# Patient Record
Sex: FEMALE | Race: WHITE | NOT HISPANIC OR LATINO | URBAN - METROPOLITAN AREA
[De-identification: names, ages, dates, MRNs, and addresses within clinical notes are randomized per-mention and may not be internally consistent; named-entity substitution may affect disease eponyms.]

---

## 2017-01-19 ENCOUNTER — OFFICE VISIT - RIVER FALLS (OUTPATIENT)
Dept: FAMILY MEDICINE | Facility: CLINIC | Age: 57
End: 2017-01-19

## 2017-01-19 ASSESSMENT — MIFFLIN-ST. JEOR: SCORE: 1203.86

## 2017-05-16 ENCOUNTER — OFFICE VISIT - RIVER FALLS (OUTPATIENT)
Dept: FAMILY MEDICINE | Facility: CLINIC | Age: 57
End: 2017-05-16

## 2017-05-16 ASSESSMENT — MIFFLIN-ST. JEOR: SCORE: 1202.95

## 2017-08-02 ENCOUNTER — OFFICE VISIT - RIVER FALLS (OUTPATIENT)
Dept: FAMILY MEDICINE | Facility: CLINIC | Age: 57
End: 2017-08-02

## 2017-08-02 ASSESSMENT — MIFFLIN-ST. JEOR: SCORE: 1209.3

## 2018-04-30 ENCOUNTER — OFFICE VISIT - RIVER FALLS (OUTPATIENT)
Dept: FAMILY MEDICINE | Facility: CLINIC | Age: 58
End: 2018-04-30

## 2018-04-30 ASSESSMENT — MIFFLIN-ST. JEOR: SCORE: 1249.22

## 2018-05-24 ENCOUNTER — OFFICE VISIT - RIVER FALLS (OUTPATIENT)
Dept: FAMILY MEDICINE | Facility: CLINIC | Age: 58
End: 2018-05-24

## 2018-07-18 ENCOUNTER — OFFICE VISIT - RIVER FALLS (OUTPATIENT)
Dept: FAMILY MEDICINE | Facility: CLINIC | Age: 58
End: 2018-07-18

## 2018-07-18 ASSESSMENT — MIFFLIN-ST. JEOR: SCORE: 1213.84

## 2018-08-27 ENCOUNTER — OFFICE VISIT - RIVER FALLS (OUTPATIENT)
Dept: FAMILY MEDICINE | Facility: CLINIC | Age: 58
End: 2018-08-27

## 2018-08-27 ASSESSMENT — MIFFLIN-ST. JEOR: SCORE: 1204.77

## 2019-10-04 ENCOUNTER — OFFICE VISIT - RIVER FALLS (OUTPATIENT)
Dept: FAMILY MEDICINE | Facility: CLINIC | Age: 59
End: 2019-10-04

## 2019-10-04 ENCOUNTER — COMMUNICATION - RIVER FALLS (OUTPATIENT)
Dept: FAMILY MEDICINE | Facility: CLINIC | Age: 59
End: 2019-10-04

## 2019-10-04 LAB
ANION GAP SERPL CALCULATED.3IONS-SCNC: 9 MMOL/L (ref 5–18)
BUN SERPL-MCNC: 15 MG/DL (ref 8–25)
BUN/CREAT RATIO - HISTORICAL: 17 (ref 10–20)
CALCIUM SERPL-MCNC: 9.5 MG/DL (ref 8.5–10.5)
CHLORIDE BLD-SCNC: 108 MMOL/L (ref 98–110)
CO2 SERPL-SCNC: 25 MMOL/L (ref 21–31)
CREAT SERPL-MCNC: 0.89 MG/DL (ref 0.57–1.11)
ERYTHROCYTE [DISTWIDTH] IN BLOOD BY AUTOMATED COUNT: 12.9 % (ref 11.5–15.5)
GFR ESTIMATE EXT - HISTORICAL: >60
GLUCOSE BLD-MCNC: 83 MG/DL (ref 65–100)
HCT VFR BLD AUTO: 42.5 % (ref 33–51)
HGB BLD-MCNC: 14.1 G/DL (ref 12–16)
MCH RBC QN AUTO: 32.1 PG (ref 26–34)
MCHC RBC AUTO-ENTMCNC: 33.2 G/DL (ref 32–36)
MCV RBC AUTO: 97 FL (ref 80–100)
PLATELET # BLD AUTO: 299 10*3/UL (ref 140–440)
PMV BLD: 9.9 FL (ref 6.5–11)
POTASSIUM BLD-SCNC: 3.9 MMOL/L (ref 3.5–5)
RBC # BLD AUTO: 4.39 10*6/UL (ref 4–5.2)
SODIUM SERPL-SCNC: 142 MMOL/L (ref 135–145)
WBC # BLD AUTO: 8.7 10*3/UL (ref 4.5–11)

## 2019-10-04 ASSESSMENT — MIFFLIN-ST. JEOR: SCORE: 1245.59

## 2019-10-07 ENCOUNTER — OFFICE VISIT - RIVER FALLS (OUTPATIENT)
Dept: FAMILY MEDICINE | Facility: CLINIC | Age: 59
End: 2019-10-07

## 2020-07-26 ENCOUNTER — NURSE TRIAGE (OUTPATIENT)
Dept: NURSING | Facility: CLINIC | Age: 60
End: 2020-07-26

## 2020-07-26 NOTE — TELEPHONE ENCOUNTER
On July 14th her  took the covid test. She currently has no symptoms. On Friday, she got a call and her  does have Covid. She is calling to find out if she should take a covid test, be on isolation for 14 days and how to get the test. Information given, and she will call the clinic in the morning and see about setting up a test. Denies need of instruction about home isolation precautions.    Ashanti Mcallister RN/ [facility]        Additional Information    Health Information question, no triage required and triager able to answer question    Protocols used: INFORMATION ONLY CALL-A-

## 2020-07-27 ENCOUNTER — AMBULATORY - RIVER FALLS (OUTPATIENT)
Dept: FAMILY MEDICINE | Facility: CLINIC | Age: 60
End: 2020-07-27

## 2020-07-27 ENCOUNTER — COMMUNICATION - RIVER FALLS (OUTPATIENT)
Dept: FAMILY MEDICINE | Facility: CLINIC | Age: 60
End: 2020-07-27

## 2020-07-27 ENCOUNTER — OFFICE VISIT - RIVER FALLS (OUTPATIENT)
Dept: FAMILY MEDICINE | Facility: CLINIC | Age: 60
End: 2020-07-27

## 2020-08-10 ENCOUNTER — COMMUNICATION - RIVER FALLS (OUTPATIENT)
Dept: FAMILY MEDICINE | Facility: CLINIC | Age: 60
End: 2020-08-10

## 2020-09-22 ENCOUNTER — OFFICE VISIT - RIVER FALLS (OUTPATIENT)
Dept: FAMILY MEDICINE | Facility: CLINIC | Age: 60
End: 2020-09-22

## 2020-09-22 ASSESSMENT — MIFFLIN-ST. JEOR: SCORE: 1274.62

## 2020-12-14 ENCOUNTER — COMMUNICATION - RIVER FALLS (OUTPATIENT)
Dept: FAMILY MEDICINE | Facility: CLINIC | Age: 60
End: 2020-12-14

## 2020-12-14 ENCOUNTER — OFFICE VISIT - RIVER FALLS (OUTPATIENT)
Dept: FAMILY MEDICINE | Facility: CLINIC | Age: 60
End: 2020-12-14

## 2020-12-14 ASSESSMENT — MIFFLIN-ST. JEOR: SCORE: 1291.86

## 2021-06-24 ENCOUNTER — OFFICE VISIT - RIVER FALLS (OUTPATIENT)
Dept: FAMILY MEDICINE | Facility: CLINIC | Age: 61
End: 2021-06-24

## 2021-06-24 ASSESSMENT — MIFFLIN-ST. JEOR: SCORE: 1255.12

## 2021-06-25 LAB
BUN SERPL-MCNC: 14 MG/DL (ref 7–25)
BUN/CREAT RATIO - HISTORICAL: NORMAL (ref 6–22)
CALCIUM SERPL-MCNC: 9.9 MG/DL (ref 8.6–10.4)
CHLORIDE BLD-SCNC: 108 MMOL/L (ref 98–110)
CHOLEST SERPL-MCNC: 267 MG/DL
CHOLEST/HDLC SERPL: 3.8 {RATIO}
CO2 SERPL-SCNC: 24 MMOL/L (ref 20–32)
CREAT SERPL-MCNC: 0.96 MG/DL (ref 0.5–0.99)
EGFRCR SERPLBLD CKD-EPI 2021: 64 ML/MIN/1.73M2
ERYTHROCYTE [DISTWIDTH] IN BLOOD BY AUTOMATED COUNT: 12.2 % (ref 11–15)
GLUCOSE BLD-MCNC: 87 MG/DL (ref 65–99)
HCT VFR BLD AUTO: 44.3 % (ref 35–45)
HDLC SERPL-MCNC: 70 MG/DL
HGB BLD-MCNC: 15.3 GM/DL (ref 11.7–15.5)
LDLC SERPL CALC-MCNC: 174 MG/DL
MCH RBC QN AUTO: 31.8 PG (ref 27–33)
MCHC RBC AUTO-ENTMCNC: 34.5 GM/DL (ref 32–36)
MCV RBC AUTO: 92.1 FL (ref 80–100)
NONHDLC SERPL-MCNC: 197 MG/DL
PLATELET # BLD AUTO: 312 10*3/UL (ref 140–400)
PMV BLD: 11.2 FL (ref 7.5–12.5)
POTASSIUM BLD-SCNC: 4.7 MMOL/L (ref 3.5–5.3)
RBC # BLD AUTO: 4.81 10*6/UL (ref 3.8–5.1)
SODIUM SERPL-SCNC: 140 MMOL/L (ref 135–146)
TRIGL SERPL-MCNC: 104 MG/DL
TSH SERPL DL<=0.005 MIU/L-ACNC: 1.09 MIU/L (ref 0.4–4.5)
WBC # BLD AUTO: 7.3 10*3/UL (ref 3.8–10.8)

## 2021-06-26 ENCOUNTER — COMMUNICATION - RIVER FALLS (OUTPATIENT)
Dept: FAMILY MEDICINE | Facility: CLINIC | Age: 61
End: 2021-06-26

## 2021-06-28 ENCOUNTER — COMMUNICATION - RIVER FALLS (OUTPATIENT)
Dept: FAMILY MEDICINE | Facility: CLINIC | Age: 61
End: 2021-06-28

## 2021-06-28 ENCOUNTER — TRANSFERRED RECORDS (OUTPATIENT)
Dept: HEALTH INFORMATION MANAGEMENT | Facility: CLINIC | Age: 61
End: 2021-06-28

## 2021-06-28 LAB — HPV ABSTRACT: NORMAL

## 2022-02-12 VITALS
BODY MASS INDEX: 22.7 KG/M2 | TEMPERATURE: 98.7 F | TEMPERATURE: 98.3 F | HEIGHT: 67 IN | DIASTOLIC BLOOD PRESSURE: 66 MMHG | HEART RATE: 66 BPM | HEIGHT: 67 IN | BODY MASS INDEX: 22.99 KG/M2 | SYSTOLIC BLOOD PRESSURE: 102 MMHG | SYSTOLIC BLOOD PRESSURE: 100 MMHG | DIASTOLIC BLOOD PRESSURE: 60 MMHG | WEIGHT: 144.6 LBS

## 2022-02-12 VITALS
OXYGEN SATURATION: 98 % | DIASTOLIC BLOOD PRESSURE: 64 MMHG | BODY MASS INDEX: 21.28 KG/M2 | SYSTOLIC BLOOD PRESSURE: 104 MMHG | DIASTOLIC BLOOD PRESSURE: 66 MMHG | WEIGHT: 137.6 LBS | OXYGEN SATURATION: 94 % | SYSTOLIC BLOOD PRESSURE: 120 MMHG | HEART RATE: 68 BPM | HEART RATE: 74 BPM | BODY MASS INDEX: 21.6 KG/M2 | WEIGHT: 135.6 LBS | HEIGHT: 67 IN | HEIGHT: 67 IN

## 2022-02-12 VITALS
HEART RATE: 100 BPM | TEMPERATURE: 98.5 F | SYSTOLIC BLOOD PRESSURE: 90 MMHG | DIASTOLIC BLOOD PRESSURE: 59 MMHG | BODY MASS INDEX: 21.22 KG/M2 | WEIGHT: 135.2 LBS | HEIGHT: 67 IN

## 2022-02-12 VITALS
DIASTOLIC BLOOD PRESSURE: 66 MMHG | BODY MASS INDEX: 24.3 KG/M2 | WEIGHT: 154.8 LBS | HEIGHT: 67 IN | SYSTOLIC BLOOD PRESSURE: 124 MMHG | HEART RATE: 72 BPM

## 2022-02-12 VITALS
BODY MASS INDEX: 23.7 KG/M2 | HEART RATE: 64 BPM | WEIGHT: 151 LBS | HEIGHT: 67 IN | SYSTOLIC BLOOD PRESSURE: 120 MMHG | RESPIRATION RATE: 16 BRPM | DIASTOLIC BLOOD PRESSURE: 68 MMHG | TEMPERATURE: 98.8 F

## 2022-02-12 VITALS
TEMPERATURE: 99.2 F | HEIGHT: 67 IN | HEART RATE: 68 BPM | WEIGHT: 135.4 LBS | DIASTOLIC BLOOD PRESSURE: 58 MMHG | BODY MASS INDEX: 21.25 KG/M2 | SYSTOLIC BLOOD PRESSURE: 110 MMHG

## 2022-02-12 VITALS
SYSTOLIC BLOOD PRESSURE: 98 MMHG | HEIGHT: 67 IN | HEART RATE: 64 BPM | WEIGHT: 146.7 LBS | DIASTOLIC BLOOD PRESSURE: 56 MMHG | BODY MASS INDEX: 23.02 KG/M2

## 2022-02-12 VITALS
BODY MASS INDEX: 21.44 KG/M2 | HEART RATE: 68 BPM | DIASTOLIC BLOOD PRESSURE: 64 MMHG | HEIGHT: 67 IN | TEMPERATURE: 97.9 F | SYSTOLIC BLOOD PRESSURE: 104 MMHG | WEIGHT: 136.6 LBS

## 2022-02-12 VITALS
SYSTOLIC BLOOD PRESSURE: 118 MMHG | HEART RATE: 72 BPM | BODY MASS INDEX: 22.82 KG/M2 | DIASTOLIC BLOOD PRESSURE: 66 MMHG | HEART RATE: 70 BPM | HEIGHT: 67 IN | DIASTOLIC BLOOD PRESSURE: 62 MMHG | BODY MASS INDEX: 23.12 KG/M2 | SYSTOLIC BLOOD PRESSURE: 114 MMHG | HEIGHT: 67 IN | WEIGHT: 145.4 LBS

## 2022-02-16 NOTE — TELEPHONE ENCOUNTER
Entered by Zeynep Milian CMA on January 14, 2020 5:55:12 PM CST  ---------------------  From: Zeynep Milian CMA   To: [place] [address]    Sent: 1/14/2020 5:55:11 PM CST  Subject: Medication Management     ** Submitted: **  Order:citalopram (citalopram 40 mg oral tablet)  1 tab(s)  Oral  daily  Qty:  30 tab(s)        Refills:  0          Substitutions Allowed     Route To Pharmacy - [place] [address]    Signed by Zeynep Milian CMA  1/14/2020 5:54:00 PM    ** Submitted: **  Complete:citalopram (citalopram 40 mg oral tablet)   Signed by Zeynep Milian CMA  1/14/2020 5:55:00 PM    ** Not Approved:  **  citalopram (Citalopram Hydrobromide 40 MG Oral Tablet)  TAKE 1 TABLET BY MOUTH  DAILY  Qty:  30 EA        Days Supply:  30        Refills:  0          Substitutions Allowed     Route To Pharmacy - [place] [address]   Signed by Zeynep Milian CMA            ------------------------------------------  From: [place] [address]  To: Serge Millan MD  Sent: January 14, 2020 9:50:04 AM CST  Subject: Medication Management  Due: January 15, 2020 9:50:04 AM CST    ** On Hold Pending Signature **  Drug: citalopram (citalopram 40 mg oral tablet)  TAKE 1 TABLET BY MOUTH  DAILY  Quantity: 30 EA  Days Supply: 30  Refills: 0  Substitutions Allowed  Notes from Pharmacy:     Dispensed Drug: citalopram (citalopram 40 mg oral tablet)  TAKE 1 TABLET BY MOUTH  DAILY  Quantity: 30 EA  Days Supply: 30  Refills: 0  Substitutions Allowed  Notes from Pharmacy:   ------------------------------------------Med Refill      Date of last office visit and reason:  10/7/19; concussion      Date of last Med Check / Px:   8/27/18; well adult  Date of last labs pertaining to med:  10/4/19    RTC order in chart:  yes; due now for px    For Protocol refill, has patient been contacted:  new RTC placed. msg sent to pharmacy

## 2022-02-16 NOTE — TELEPHONE ENCOUNTER
Order, demographics, and notes faxed to [facility], they will contact patient to schedule.

## 2022-02-16 NOTE — TELEPHONE ENCOUNTER
---------------------  From: López Sandhu MD   To: [facility] Message Pool ([id number]_[place] [facility] [place]);     Sent: 7/27/2020 8:59:12 AM CDT  Subject: General Message     Patient needs curbside testing.  She may qualify for [facility] dept testing.---------------------  From: Geeta Bah MA ([facility] Message Pool ([id number]_[facility]))   To: Appointment Pool ([id number]_[place]);     Sent: 7/27/2020 9:06:02 AM CDT  Subject: FW: General Message     Please call pt to set up curbside testing per [facility].  Thanks.[facility] re: occupation verification.SCHEDULED

## 2022-02-16 NOTE — NURSING NOTE
Comprehensive Intake Entered On:  6/24/2021 3:34 PM CDT    Performed On:  6/24/2021 3:29 PM CDT by Justine Redmond CMA               Summary   Chief Complaint :   Annual Physical-  c/o dizziness with movement when it gets really hot. Feels she's drinking a lot of water   Menstrual Status :   Postmenopausal   Weight Measured :   146.7 lb(Converted to: 146 lb 11 oz, 66.542 kg)    Height Measured :   66.5 in(Converted to: 5 ft 6 in, 168.91 cm)    Body Mass Index :   23.32 kg/m2   Body Surface Area :   1.77 m2   Systolic Blood Pressure :   98 mmHg   Diastolic Blood Pressure :   56 mmHg (LOW)    Mean Arterial Pressure :   70 mmHg   Peripheral Pulse Rate :   64 bpm   Justine Redmond CMA - 6/24/2021 3:29 PM CDT   Health Status   Allergies Verified? :   Yes   Medication History Verified? :   Yes   Immunizations Current :   No   Pre-Visit Planning Status :   Completed   Tobacco Use? :   Current every day smoker   Justine Redmond CMA - 6/24/2021 3:29 PM CDT   Consents   Consent for Immunization Exchange :   Consent Granted   Consent for Immunizations to Providers :   Consent Granted   Justine Redmond CMA - 6/24/2021 3:29 PM CDT   Meds / Allergies   (As Of: 6/24/2021 3:34:59 PM CDT)   Allergies (Active)   No known allergies  Estimated Onset Date:   Unspecified ; Created By:   Justine Roland; Reaction Status:   Active ; Category:   Drug ; Substance:   No known allergies ; Type:   Allergy ; Updated By:   Justine Roland; Source:   Paper Chart ; Reviewed Date:   12/15/2020 11:17 AM CST        Medication List   (As Of: 6/24/2021 3:34:59 PM CDT)   Prescription/Discharge Order    albuterol  :   albuterol ; Status:   Prescribed ; Ordered As Mnemonic:   albuterol 90 mcg/inh inhalation aerosol ; Simple Display Line:   See Instructions, INHALE TWO PUFFS BY MOUTH EVERY 4 HOURS AS NEEDED, 18 gm, 0 Refill(s) ; Ordering Provider:   Serge Millan MD; Catalog Code:   albuterol ; Order Dt/Tm:   1/26/2021  2:39:17 PM CST          citalopram  :   citalopram ; Status:   Prescribed ; Ordered As Mnemonic:   citalopram 40 mg oral tablet ; Simple Display Line:   1 tab(s), Oral, daily, *NEEDS APPT FOR FURTHER REFILLS*., 30 tab(s), 0 Refill(s) ; Ordering Provider:   Serge Millan MD; Catalog Code:   citalopram ; Order Dt/Tm:   5/21/2021 9:52:50 AM CDT          ibuprofen  :   ibuprofen ; Status:   Prescribed ; Ordered As Mnemonic:   ibuprofen 800 mg oral tablet ; Simple Display Line:   1 tab(s), Oral, q8 hrs, 50 tab(s), 0 Refill(s) ; Ordering Provider:   Serge Millan MD; Catalog Code:   ibuprofen ; Order Dt/Tm:   3/18/2021 10:02:57 AM CDT            ID Risk Screen   Recent Travel History :   No recent travel   Family Member Travel History :   No recent travel   Other Exposure to Infectious Disease :   Unknown   COVID-19 Testing Status :   No positive COVID-19 test   Justine Redmond CMA - 6/24/2021 3:29 PM CDT

## 2022-02-16 NOTE — NURSING NOTE
Comprehensive Intake Entered On:  10/7/2019 4:11 PM CDT    Performed On:  10/7/2019 4:08 PM CDT by Sheela Shaw               Summary   Chief Complaint :   Pt here today for f/up after passing out. She states that head still hurts.    Menstrual Status :   Postmenopausal   Height Measured :   66.5 in(Converted to: 5 ft 6 in, 168.91 cm)    Systolic Blood Pressure :   100 mmHg   Diastolic Blood Pressure :   66 mmHg   Mean Arterial Pressure :   77 mmHg   BP Site :   Right arm   BP Method :   Manual   HR Method :   Manual   Temperature Tympanic :   98.7 DegF(Converted to: 37.1 DegC)    Sheela Shaw - 10/7/2019 4:08 PM CDT   Health Status   Allergies Verified? :   Yes   Medication History Verified? :   Yes   Immunizations Current :   No   Medical History Verified? :   Yes   Pre-Visit Planning Status :   Completed   Sheela Shaw - 10/7/2019 4:08 PM CDT   Consents   Consent for Immunization Exchange :   Consent Granted   Consent for Immunizations to Providers :   Consent Granted   Sheela Shaw - 10/7/2019 4:08 PM CDT   Meds / Allergies   (As Of: 10/7/2019 4:11:28 PM CDT)   Allergies (Active)   No known allergies  Estimated Onset Date:   Unspecified ; Created By:   Justine Redmond CMA; Reaction Status:   Active ; Category:   Drug ; Substance:   No known allergies ; Type:   Allergy ; Updated By:   Justine Redmond CMA; Source:   Paper Chart ; Reviewed Date:   10/7/2019 4:09 PM CDT        Medication List   (As Of: 10/7/2019 4:11:28 PM CDT)   Prescription/Discharge Order    ibuprofen  :   ibuprofen ; Status:   Prescribed ; Ordered As Mnemonic:   ibuprofen 800 mg oral tablet ; Simple Display Line:   1 tab(s), Oral, q8 hrs, 50 tab(s), 0 Refill(s) ; Ordering Provider:   Serge Millan MD; Catalog Code:   ibuprofen ; Order Dt/Tm:   10/1/2019 10:54:16 AM CDT          citalopram  :   citalopram ; Status:   Prescribed ; Ordered As Mnemonic:   citalopram 40 mg oral  tablet ; Simple Display Line:   1 tab(s), Oral, daily, 30 tab(s), 0 Refill(s) ; Ordering Provider:   Serge Millan MD; Catalog Code:   citalopram ; Order Dt/Tm:   10/1/2019 10:52:26 AM CDT            Home Meds    acetaminophen  :   acetaminophen ; Status:   Documented ; Ordered As Mnemonic:   Tylenol Extra Strength ; Simple Display Line:   Oral, q6 hrs, 0 Refill(s) ; Catalog Code:   acetaminophen ; Order Dt/Tm:   10/7/2019 4:09:39 PM CDT

## 2022-02-16 NOTE — RESULTS
Spirometry POC Entered On:  5/23/2017 8:12 AM CDT    Performed On:  5/16/2017 4:47 PM CDT by Patricia Ohara               Spirometry POC   Forced Vital Capacity Predicted :   3.66 L   Forced Vital Capacity Actual :   2.43 L   Forced Vital Capacity % Predicted :   66 %   Forced Expiratory Volume 1sec Predicted :   2.85 L   Forced Expiratory Volume 1sec Actual :   1.91 L   Forced Expiratory Volume 1 % Predicted :   67 %   FEV1/FVC Predicted :   79 %   FEV1/FVC Actual :   79 %   FEV1/FVC % Predicted :   100 %   FEF 25 to 75   Predicted :   2.64 L   FEF 25 to 75 Actual :   1.66 L   FEF 25 to 75 % Predicted :   63 %   Spirometry POC Comments :   Moderate Restriction   Patricia Ohara - 5/23/2017 8:11 AM CDT

## 2022-02-16 NOTE — PROGRESS NOTES
Patient:   MYA MCDOWELL            MRN: [id number]            FIN: [id number]               Age:   60 years     Sex:  Female     :  1960   Associated Diagnoses:   Well adult exam; Mild depression; Emphysema/COPD   Author:   Serge Millan MD      Chief Complaint   2021 3:29 PM CDT    Annual Physical-  c/o dizziness with movement when it gets really hot. Feels she's drinking a lot of water        History of Present Illness   see chief complaint as noted above and confirmed with the patient     60 year old female presents for an annual exam. Overall she is doing pretty well. 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     Colonscopy: Last Colonoscopy in  and is to repeat in .     Pap smear: Last one done 2015 and results came back normal.     Immunizations: Has had both pneumonia vaccines, last Tdap in , did have covid vaccine      Review of Systems   Constitutional:  No fever, No fatigue.    Ear/Nose/Mouth/Throat:  No nasal congestion, No sore throat.    Respiratory:  has started to notice SOB with steps and harder activity, No cough, No sputum production.    Cardiovascular:  No chest pain, No palpitations, No peripheral edema, No syncope.    Gastrointestinal:  No nausea, No vomiting, No diarrhea.    Genitourinary:  No dysuria, No hematuria, No change in urine stream.    Hematology/Lymphatics:  No bruising tendency.    Musculoskeletal:  No back pain.    Integumentary:  No rash.    Neurologic:  Alert and oriented X4, No headache.    Psychiatric:  Depression, Depression: Well controlled with medications , No anxiety.       Health Status   Allergies:    Allergic Reactions (Selected)  No known allergies   Medications:  (Selected)   Prescriptions  Prescribed  Incruse Ellipta 62.5 mcg/inh inhalation powder: = 1 inh, Inhale, q 24 hrs, # 30 blister, 5 Refill(s), Type: Maintenance, Pharmacy: [facility], 1 inh  Inhale q 24 hrs, 66.5, in, 06/24/21 15:29:00 CDT, Height Measured, 146.7, lb, 06/24/21 15:29:00 CDT, Weight Measured  Nicoderm C-Q Clear 14 mg/24 hr transdermal film, extended release: 1 patch(es), TD, daily, x 28 day(s), # 28 patch(es), 0 Refill(s), Type: Acute, Pharmacy: [facility], 1 patch(es) TD daily,x28 day(s), 66.5, in, 06/24/21 15:29:00 CDT, Height Measured, 146.7, lb, 06/24/21 15:29:00 CDT, Weight Measured  albuterol 90 mcg/inh inhalation aerosol: See Instructions, Instructions: INHALE TWO PUFFS BY MOUTH EVERY 4 HOURS AS NEEDED, # 18 gm, 3 Refill(s), Pharmacy: [facility], INHALE TWO PUFFS BY MOUTH EVERY 4 HOURS AS NEEDED, 66.5, in, 06/24/21 15:29:00 CDT, Height Measured, 146.7, lb, 0...  buPROPion 150 mg/12 hours (SR) oral tablet, extended release: = 1 tab(s) ( 150 mg ), Oral, bid, # 60 tab(s), 0 Refill(s), Type: Maintenance, Pharmacy: [facility], 1 tab(s) Oral bid, 66.5, in, 06/24/21 15:29:00 CDT, Height Measured, 146.7, lb, 06/24/21 15:29:00 CDT, Weight Measured  citalopram 40 mg oral tablet: = 1 tab(s), Oral, daily, # 90 tab(s), 3 Refill(s), Type: Maintenance, Pharmacy: [facility], 1 tab(s) Oral daily,x90 day(s), 66.5, in, 06/24/21 15:29:00 CDT, Height Measured, 146.7, lb, 06/24/21 15:29:00 CDT, Weight Measured  ibuprofen 800 mg oral tablet: = 1 tab(s), Oral, q8 hrs, # 50 tab(s), 0 Refill(s), Type: Acute, Pharmacy: [facility], TAKE 1 TABLET BY MOUTH EVERY 8 HOURS, 66.5, in, 12/14/20 15:41:00 CST, Height Measured, 154.8, lb, 12/14/20 15:41:00 CST, Weight Measured,    Medications          *denotes recorded medication          albuterol 90 mcg/inh inhalation aerosol: See Instructions, INHALE TWO PUFFS BY MOUTH EVERY 4 HOURS AS NEEDED, 18 gm, 3 Refill(s).          buPROPion 150 mg/12 hours (SR) oral tablet, extended release: 150 mg, 1 tab(s), Oral, bid, 60 tab(s), 0 Refill(s).          citalopram 40 mg oral tablet: 1 tab(s), Oral, daily, for 90 day(s),  90 tab(s), 3 Refill(s).          ibuprofen 800 mg oral tablet: 1 tab(s), Oral, q8 hrs, 50 tab(s), 0 Refill(s).          Nicoderm C-Q Clear 14 mg/24 hr transdermal film, extended release: 1 patch(es), TD, daily, for 28 day(s), 28 patch(es), 0 Refill(s).          Incruse Ellipta 62.5 mcg/inh inhalation powder: 1 inh, Inhale, q 24 hrs, 30 blister, 5 Refill(s).       Problem list:    All Problems  Chronic diarrhea / 840690195 / Confirmed  Tobacco Abuse-Unspec / 305.1 / Confirmed  Mild depression / 518235267 / Confirmed  Emphysema/COPD / 451721604 / Confirmed  Resolved: Pregnancy / 086363997  Resolved: Pregnancy / 215001076  Resolved: Pregnancy / 575732690      Histories   Past Medical History:    Active  Chronic diarrhea (053180303)  Tobacco Abuse-Unspec (305.1)  Mild depression (794043672)  Resolved  Pregnancy (000975182):  Resolved on 5/18/1981 at 20 years.  Pregnancy (934115338):  Resolved on 10/28/1983 at 22 years.  Pregnancy (696599026):  Resolved on 12/23/1984 at 23 years.   Family History:    CA - Breast cancer  Aunt (P)  Comments:  5/29/2012 7:09 PM CDT - Brittny Hutchison  Late 40s or early 50s.  Alzheimer's disease  Father  CAD - Coronary artery disease  Father  High cholesterol  Father  Arthritis  Father     Procedure history:    Colonoscopy (SNOMED CT 642952616) performed by Prateek Marie MD on 7/7/2011 at 50 Years.  Comments:  12/16/2011 9:03 AM CST - Rosanna Rodriguez  Repeat in 5-10 yrs. pending pathology.   IV sedation.  Flexible sigmoidoscope (SNOMED CT 872121963) on 5/2/2007 at 46 Years.  Hysteroscopy (SNOMED CT 144845520) performed by Charlie Kim MD on 3/21/2002 at 41 Years.  D&C - Dilatation and curettage (SNOMED CT 6776417263) on 3/21/2002 at 41 Years.  Breast augmentation (SNOMED CT 5459592140) in 1996 at 36 Years.  Tubal ligation (SNOMED CT 344137851) in 1984 at 24 Years.  Pregnancy (SNOMED CT 291352658).  Comments:  4/15/2010 3:54 PM RAKESHT - Justine Roland  full term X 3   Social History:         Electronic Cigarette/Vaping Assessment            Electronic Cigarette Use: Never.      Alcohol Assessment            Current, 1-2 times per week, 3 drinks/episode average.  4 drinks/episode maximum.      Tobacco Assessment            10 or more cigarettes (1/2 pack or more)/day in last 30 days      Substance Abuse Assessment            Past, Marijuana      Employment and Education Assessment            Employed, Work/School description: /.      Home and Environment Assessment            Marital status: .  Spouse/Partner name: Mihai.  Risks in environment: Does not wear helmet, owns               secured gun.      Nutrition and Health Assessment            Type of diet: Regular.      Exercise and Physical Activity Assessment            Exercise frequency: Daily.  Exercise type: Walking.      Sexual Assessment            Sexually active: No.  Identifies as female, Sexual orientation: Straight or heterosexual.        Physical Examination   Vital Signs   6/24/2021 3:29 PM CDT Peripheral Pulse Rate 64 bpm    Systolic Blood Pressure 98 mmHg    Diastolic Blood Pressure 56 mmHg  LOW    Mean Arterial Pressure 70 mmHg      Measurements from flowsheet : Measurements   6/24/2021 3:29 PM CDT Height Measured 66.5 in    Weight Measured 146.7 lb    BSA 1.77 m2    Body Mass Index 23.32 kg/m2      General:  Alert and oriented, No acute distress.    Eye:  Pupils are equal, round and reactive to light, Normal conjunctiva.    HENT:  Normocephalic, Tympanic membranes are clear, Oral mucosa is moist, No pharyngeal erythema.    Neck:  Supple, Non-tender, No lymphadenopathy.    Respiratory:  Lungs are clear to auscultation, Respirations are non-labored.    Cardiovascular:  Normal rate, Regular rhythm, Normal peripheral perfusion, No edema.    Breast:  No mass, No tenderness, No discharge.         Shape/size: Bilaterally, Within normal limits.    Gastrointestinal:  Soft, Non-tender, Non-distended, No  organomegaly.    Genitourinary:  normal external genitalia  normal vaginal mucosa  os cx is mobile and nontender, no lesions  no adnexal tendernes or masses.    Musculoskeletal:  Normal range of motion, Normal strength, No swelling, Normal gait.    Integumentary:  Warm, No rash.    Neurologic:  Alert, Oriented.    Psychiatric:  Cooperative, Appropriate mood & affect, Normal judgment.       Review / Management   Results review      Impression and Plan       Diagnosis     Well adult exam (WYY53-UB Z00.00).     Mild depression (HTE58-BX F32.0).     Emphysema/COPD (BPS34-WR J43.9).     Course:  reviewed exercise and diet   discussed weight and nutrition  reviewed health maintence and encouraged completion  questions were answered regarding health, medicines and future expectations.    Plan:  will do colonoscopy  started on incruse and will use albuterol prn  stop smoking and reviewed treatment help choices  .    Orders     Orders (Selected)   Prescriptions  Prescribed  Incruse Ellipta 62.5 mcg/inh inhalation powder: = 1 inh, Inhale, q 24 hrs, # 30 blister, 5 Refill(s), Type: Maintenance, Pharmacy: [facility], 1 inh Inhale q 24 hrs, 66.5, in, 06/24/21 15:29:00 CDT, Height Measured, 146.7, lb, 06/24/21 15:29:00 CDT, Weight Measured  Nicoderm C-Q Clear 14 mg/24 hr transdermal film, extended release: 1 patch(es), TD, daily, x 28 day(s), # 28 patch(es), 0 Refill(s), Type: Acute, Pharmacy: [facility], 1 patch(es) TD daily,x28 day(s), 66.5, in, 06/24/21 15:29:00 CDT, Height Measured, 146.7, lb, 06/24/21 15:29:00 CDT, Weight Measured  albuterol 90 mcg/inh inhalation aerosol: See Instructions, Instructions: INHALE TWO PUFFS BY MOUTH EVERY 4 HOURS AS NEEDED, # 18 gm, 3 Refill(s), Pharmacy: [facility], INHALE TWO PUFFS BY MOUTH EVERY 4 HOURS AS NEEDED, 66.5, in, 06/24/21 15:29:00 CDT, Height Measured, 146.7, lb, 0...  buPROPion 150 mg/12 hours (SR) oral tablet, extended release: = 1 tab(s) ( 150 mg  ), Oral, bid, # 60 tab(s), 0 Refill(s), Type: Maintenance, Pharmacy: [facility], 1 tab(s) Oral bid, 66.5, in, 06/24/21 15:29:00 CDT, Height Measured, 146.7, lb, 06/24/21 15:29:00 CDT, Weight Measured  citalopram 40 mg oral tablet: = 1 tab(s), Oral, daily, # 90 tab(s), 3 Refill(s), Type: Maintenance, Pharmacy: [facility], 1 tab(s) Oral daily,x90 day(s), 66.5, in, 06/24/21 15:29:00 CDT, Height Measured, 146.7, lb, 06/24/21 15:29:00 CDT, Weight Measured.     Melinda TELLES Medical Assistant acted solely as a scribe for, and in presence of Dr. Serge Millan who performed the services.

## 2022-02-16 NOTE — TELEPHONE ENCOUNTER
---------------------  From: Zeynep Milian CMA ([place] Pool ([id number]))   To: [facility] Message Pool ([id number]_[place] - [place]);     Sent: 3/18/2021 7:01:30 AM CDT  Subject: FW: Medication Management   Due Date/Time: 3/17/2021 1:33:00 PM CDT     last filled 9/15/20 #50, 0 refills  12/14/21 shingles   [facility] due now for px        ** Submitted: **  Order:citalopram (citalopram 40 mg oral tablet)  1 tab(s)  Oral  daily  Qty:  30 tab(s)        Refills:  0          Substitutions Allowed     Route To Pharmacy - [facility]    Signed by Zeynep Milian CMA  3/18/2021 12:00:00 PM [facility]    ** Submitted: **  Complete:citalopram (citalopram 40 mg oral tablet)   Signed by Zeynep Milian CMA  3/18/2021 12:00:00 PM [facility]    ** Not Approved:  **  citalopram (Citalopram Hydrobromide 40 MG Oral Tablet)  Take 1 tablet by mouth once daily  Qty:  90 tab(s)        Days Supply:  90        Refills:  0          Substitutions Allowed     Route To Pharmacy - [place] [address]   Signed by Zeynep Milian CMA            ------------------------------------------  From: [facility]  To: Serge Millan MD  Sent: March 16, 2021 1:33:52 PM CDT  Subject: Medication Management  Due: March 16, 2021 4:14:43 PM CDT     ** On Hold Pending Signature **     Dispensed Drug: citalopram (citalopram 40 mg oral tablet), Take 1 tablet by mouth once daily  Quantity: 90 tab(s)  Days Supply: 90  Refills: 0  Substitutions Allowed  Notes from Pharmacy:     ** On Hold Pending Signature **     Dispensed Drug: ibuprofen (ibuprofen 800 mg oral tablet), TAKE 1 TABLET BY MOUTH EVERY 8 HOURS  Quantity: 50 tab(s)  Days Supply: 17  Refills: 0  Substitutions Allowed  Notes from Pharmacy:  ---------------------------------------------------------------  From: Justine Redmond CMA ([facility] Message Pool ([id number]_[facility]))   To: Serge Millan MD;     Sent: 3/18/2021 8:14:25 AM CDT  Subject: FW: Medication Management   Due Date/Time: 3/17/2021 1:33:00 PM  CDT     please advise on ibuprofen---------------------  From: Serge Millan MD   To: [facility]    Sent: 3/18/2021 10:03:00 AM CDT  Subject: FW: Medication Management     ** Submitted: **  Complete:ibuprofen (ibuprofen 800 mg oral tablet)   Signed by Serge Millan MD  3/18/2021 3:03:00 PM [facility]    ** Approved with modifications: **  ibuprofen (Ibuprofen 800 MG Oral Tablet)  TAKE 1 TABLET BY MOUTH EVERY 8 HOURS  Qty:  50 tab(s)        Days Supply:  17        Refills:  0          Substitutions Allowed     Route To Pharmacy - [place] [id number]

## 2022-02-16 NOTE — NURSING NOTE
Comprehensive Intake Entered On:  12/14/2020 3:45 PM CST    Performed On:  12/14/2020 3:41 PM CST by Justine Redmond CMA               Summary   Chief Complaint :   c/o right shoulder pain with rash, numbness and tingling down arm. Fell in Sept in same area. Taking ibuprofen 800mg q 4 hrs but only lasts for about 3 hrs   Menstrual Status :   Postmenopausal   Weight Measured :   154.8 lb(Converted to: 154 lb 13 oz, 70.216 kg)    Height Measured :   66.5 in(Converted to: 5 ft 6 in, 168.91 cm)    Body Mass Index :   24.61 kg/m2   Body Surface Area :   1.81 m2   Systolic Blood Pressure :   124 mmHg   Diastolic Blood Pressure :   66 mmHg   Mean Arterial Pressure :   85 mmHg   Peripheral Pulse Rate :   72 bpm   Justine Redmond CMA - 12/14/2020 3:41 PM CST   Health Status   Allergies Verified? :   Yes   Medication History Verified? :   Yes   Immunizations Current :   No   Pre-Visit Planning Status :   Completed   Tobacco Use? :   Current every day smoker   Justine Redmond CMA - 12/14/2020 3:41 PM CST   Consents   Consent for Immunization Exchange :   Consent Granted   Consent for Immunizations to Providers :   Consent Granted   Justine Redmond CMA - 12/14/2020 3:41 PM CST   Meds / Allergies   (As Of: 12/14/2020 3:45:53 PM CST)   Allergies (Active)   No known allergies  Estimated Onset Date:   Unspecified ; Created By:   Justine Roland; Reaction Status:   Active ; Category:   Drug ; Substance:   No known allergies ; Type:   Allergy ; Updated By:   Justine Roland; Source:   Paper Chart ; Reviewed Date:   9/22/2020 1:24 PM CDT        Medication List   (As Of: 12/14/2020 3:45:53 PM CST)   Prescription/Discharge Order    citalopram  :   citalopram ; Status:   Prescribed ; Ordered As Mnemonic:   citalopram 40 mg oral tablet ; Simple Display Line:   1 tab(s), Oral, daily, 90 tab(s), 0 Refill(s) ; Ordering Provider:   Serge Millan MD; Catalog Code:   citalopram ; Order Dt/Tm:   9/15/2020  12:19:20 PM CDT          ibuprofen  :   ibuprofen ; Status:   Prescribed ; Ordered As Mnemonic:   ibuprofen 800 mg oral tablet ; Simple Display Line:   1 tab(s), Oral, q8 hrs, 50 tab(s), 0 Refill(s) ; Ordering Provider:   Serge Millna MD; Catalog Code:   ibuprofen ; Order Dt/Tm:   9/15/2020 12:19:21 PM CDT          varenicline  :   varenicline ; Status:   Processing ; Ordered As Mnemonic:   Chantix 1 mg oral tablet ; Ordering Provider:   Serge Millan MD; Action Display:   Complete ; Catalog Code:   varenicline ; Order Dt/Tm:   12/14/2020 3:44:16 PM CST            ID Risk Screen   Recent Travel History :   No recent travel   Family Member Travel History :   No recent travel   Other Exposure to Infectious Disease :   Unknown   Justine Redomnd CMA - 12/14/2020 3:41 PM CST   Social History   Social History   (As Of: 12/14/2020 3:45:53 PM CST)   Alcohol:        Current, 1-2 times per week, 3 drinks/episode average.  4 drinks/episode maximum.   (Last Updated: 8/29/2018 8:49:40 AM CDT by Patricia Ohara)          Tobacco:        10 or more cigarettes (1/2 pack or more)/day in last 30 days   (Last Updated: 12/14/2020 3:44:36 PM CST by Justine Redmond CMA)          Electronic Cigarette/Vaping:        Electronic Cigarette Use: Never.   (Last Updated: 12/14/2020 3:44:39 PM CST by Justine Redmond CMA)          Substance Abuse:        Past, Marijuana   (Last Updated: 5/16/2017 10:32:03 AM CDT by Patricia Ohara)          Employment/School:        Employed, Work/School description: /.   (Last Updated: 5/16/2017 10:31:33 AM CDT by Patricia Ohara)          Home/Environment:        Marital status: .  Spouse/Partner name: Mihai.  Risks in environment: Does not wear helmet, owns secured gun.   (Last Updated: 5/16/2017 10:32:28 AM CDT by Patricia Ohara)          Nutrition/Health:        Type of diet: Regular.   (Last Updated: 3/15/2016 12:59:49 PM CDT by Melinda Drake MA           Exercise:        Exercise frequency: Daily.  Exercise type: Walking.   (Last Updated: 5/16/2017 10:32:54 AM CDT by Patricia Ohara)          Sexual:        Sexually active: No.  Identifies as female, Sexual orientation: Straight or heterosexual.   (Last Updated: 8/29/2018 8:50:40 AM CDT by Patricia Ohara)

## 2022-02-16 NOTE — TELEPHONE ENCOUNTER
---------------------  From: Aleah Boo CMA   Sent: 8/9/2020 8:34:14 AM CDT  Subject: COVID result     Called and notified her that COVID19 was negative, she verbalized a good understanding.       MIKAL Boo CMA
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
## 2022-02-16 NOTE — LETTER
(Inserted Image. Unable to display)         January 16, 2020        MYA MCDOWELL  [address] [place], [place] [id number]        Dear MYA,    Thank you for selecting [facility] for your healthcare needs.    Our records indicate you are due for the following services:     Annual Physical     To schedule an appointment or if you have further questions, please contact your primary clinic:   [facility]       [phone]   [facility]       [phone]              [facility]     [phone]      Powered by [employer]    Sincerely,    Serge Millan MD

## 2022-02-16 NOTE — TELEPHONE ENCOUNTER
Order faxed to [facility] patient is aware they will contact her to schedule.      History of Lung cancer?  No  Signs/symptoms   No  Current smoke  Yes   Number ppd .75  x  22.5  years = total  Attestation to shared decision making and smoking cessation guidance completed? Yes

## 2022-02-16 NOTE — TELEPHONE ENCOUNTER
---------------------  From: Smitha Oneill CMA   To: MYA MCDOWELL    Sent: 5/21/2021 9:52:41 AM CDT  Subject: Refill     Dear Mya,    We received a refill request for your Citalopram. You are due for an appointment to follow up for this medication. You are also due for your annual physical. Please schedule an appointment, I have sent in a 30 day supply of your medication.     Thank you,    Smitha MCCLENDON CMA   none

## 2022-02-16 NOTE — PROGRESS NOTES
Chief Complaint    Patient is here c/o back pain/hip pain on left side. Pain has been present 7/27/17 when patient lifted a cooler. Has been using ice and heat which have helped. Has shooting pains in legs when lifting items.  History of Present Illness      HPI patient presents to clinic today with left-sided back pain.  It initially started on 7/27 with acute pain that occurred while lifting a cooler and twisting.  Ibuprofen ice heat and warm shower to help with the pain and was starting to feel better however at work she was asked to do some lifting and this caused acute worsening of symptoms again at this time the pain is aching and occasionally shoots down her left leg it is worth with movement loss of control of bowel or bladder she has no saddle paresthesias she reports that it hurts when she pushes area by her left upper buttock and also extending down laterally and wraps around the front of her hip.  It does not radiate into the groin and she describes the pain as cramping and spasm and of moderate intensity  Review of Systems      No fevers chills nausea vomiting diarrhea constipation sore throat runny nose change of all the bladder rash or changes in appetite.  Physical Exam   Vitals & Measurements    T: 97.9(Tympanic)  HR: 68(Peripheral)  BP: 104/64     HT: 66.5 in  WT: 136.6 lb  BMI: 21.72       General alert and oriented ×3 time a injected anicteric hearing grossly normal at conversational levels neck is supple and dry without rashes cardiovascular brisk capillary refill and normal perfusion chest equal bilateral chest rise with no increased work of breathing however prolonged expiratory phase.  Axial skeletal patient has a slow sit to stand using her arms to assist with this she hasn't I tell Russell tomas she has tenderness in her left lower paraspinous muscles some tenderness to palpation in her left upper [place] however I am no tenderness over the sacroiliac joint notification classes are palpated she  have some tenderness over her truck and her personal however no swelling is noted she also has tenderness to palpation at her left groin but again no discrete masses no redness or warmth tenderness is mild  Assessment/Plan       Hip strain         Offered physical therapy patient declined at this time she is welcome to let me know if she changes her mind or to follow-up with her PCP.  Her ibuprofen was just refilled to plan to continue that we will plan to add a muscle relaxant she was cautioned not to try driving while on this medication she will return to clinic if her symptoms worsen or do not improve         Ordered:          methocarbamol, 1 tab(s) ( 500 mg ), PO, QID, PRN: for muscle pain, # 30 tab(s), 1 Refill(s), Type: Maintenance, Pharm[id number], 1 tab(s) po qid,x7 day(s),PRN:for muscle pain           Problem List/Past Medical History    Ongoing     Chronic diarrhea     Emphysema/COPD     Mild depression     Tobacco Abuse-Unspec    Historical     Pregnancy     Pregnancy     Pregnancy  Procedure/Surgical History     Colonoscopy (07/07/2011)     Mammography; bilateral (01/28/2009)     Flexible sigmoidoscope (05/02/2007)     D&C - Dilatation and curettage (03/21/2002)     Hysteroscopy (03/21/2002)     Breast augmentation (1996)     Tubal ligation (1984)     Pregnancy  Medications    citalopram 40 mg oral tablet, 40 mg= 1 tab(s), po, daily, 3 refills    ibuprofen 800 mg oral tablet, 800 mg= 1 tab(s), po, q8 hrs, 2 refills    Robaxin 500 mg oral tablet, 500 mg= 1 tab(s), po, qid, PRN, 1 refills  Allergies    No known allergies  Social History    Smoking Status - 08/02/2017     Current every day smoker     Alcohol - 05/16/2017      Current, 2 times per week, 6 drinks/episode average.     Employment and Education - 05/16/2017      Employed, Work/School description: /.     Exercise and Physical Activity - 05/16/2017      Exercise frequency: Daily. Exercise type:  Walking.     Home and Environment - 05/16/2017      Marital status: . Spouse/Partner name: Mihai. Risks in environment: Does not wear helmet, owns secured gun.     Nutrition and Health - 05/16/2017      Type of diet: Regular.     Sexual - 05/16/2017      Sexually active: No. Sexual orientation: Heterosexual.     Substance Abuse - 05/16/2017      Past, Marijuana     Tobacco - 05/16/2017      Current, Cigarettes, 20 per day. 34 year(s).  Family History    Alzheimer's disease: Father.    CA - Breast cancer: Aunt (P).    CAD - Coronary artery disease: Father.  Immunizations      Vaccine Date Status      pneumococcal (PCV13) 05/16/2017 Given      pneumococcal (PPSV23) 03/15/2016 Given      tetanus/diphth/pertuss (Tdap) adult/adol 12/20/2012 Given      Td 07/26/2005 Recorded  Lab Results      Results (Last 90 days)      No results located.
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
## 2022-02-16 NOTE — TELEPHONE ENCOUNTER
---------------------  From: Nohemy Kemp   Sent: 7/27/2020 4:09:26 PM CDT  Subject: Curbside Testing     Patient was in for curbside testing.   Per Dr. Sandhu            O2 sat= 98%  Specimen sent to [facility] lab.     Forms faxed to [facility].     Priority# 2

## 2022-02-16 NOTE — TELEPHONE ENCOUNTER
---------------------  From: Serge Millan MD   To: MYA MCDOWELL    Sent: 6/26/2021 9:22:51 PM CDT  Subject: General Message     Your good cholesterol is excellent but your bad cholesterol is too high.   The most important step for you is to stop smoking.      Results:  Date Result Name Ind Value Ref Range   6/24/2021 4:28 PM Sodium Level  140 mmol/L (135 - 146)   6/24/2021 4:28 PM Potassium Level  4.7 mmol/L (3.5 - 5.3)   6/24/2021 4:28 PM Chloride Level  108 mmol/L (98 - 110)   6/24/2021 4:28 PM CO2 Level  24 mmol/L (20 - 32)   6/24/2021 4:28 PM Glucose Level  87 mg/dL (65 - 99)   6/24/2021 4:28 PM BUN  14 mg/dL (7 - 25)   6/24/2021 4:28 PM Creatinine Level  0.96 mg/dL (0.50 - 0.99)   6/24/2021 4:28 PM BUN/Creat Ratio  NOT APPLICABLE (6 - 22)   6/24/2021 4:28 PM eGFR  64 mL/min/1.73m2 (> OR = 60 - )   6/24/2021 4:28 PM eGFR African American  75 mL/min/1.73m2 (> OR = 60 - )   6/24/2021 4:28 PM Calcium Level  9.9 mg/dL (8.6 - 10.4)   6/24/2021 4:28 PM Cholesterol ((H)) 267 mg/dL ( - <200)   6/24/2021 4:28 PM Non-HDL Cholesterol ((H)) 197 ( - <130)   6/24/2021 4:28 PM HDL  70 mg/dL (> OR = 50 - )   6/24/2021 4:28 PM Cholesterol/HDL Ratio  3.8 ( - <5.0)   6/24/2021 4:28 PM LDL ((H)) 174    6/24/2021 4:28 PM Triglyceride  104 mg/dL ( - <150)   6/24/2021 4:28 PM TSH  1.09 mIU/L (0.40 - 4.50)   6/24/2021 4:28 PM WBC  7.3 (3.8 - 10.8)   6/24/2021 4:28 PM RBC  4.81 (3.80 - 5.10)   6/24/2021 4:28 PM Hgb  15.3 gm/dL (11.7 - 15.5)   6/24/2021 4:28 PM Hct  44.3 % (35.0 - 45.0)   6/24/2021 4:28 PM MCV  92.1 fL (80.0 - 100.0)   6/24/2021 4:28 PM MCH  31.8 pg (27.0 - 33.0)   6/24/2021 4:28 PM MCHC  34.5 gm/dL (32.0 - 36.0)   6/24/2021 4:28 PM RDW  12.2 % (11.0 - 15.0)   6/24/2021 4:28 PM Platelet  312 (140 - 400)   6/24/2021 4:28 PM MPV  11.2 fL (7.5 - 12.5)

## 2022-02-16 NOTE — PROGRESS NOTES
Patient:   BERONICA MCDOWELL            MRN: [id number]            FIN: [id number]               Age:   57 years     Sex:  Female     :  1960   Associated Diagnoses:   Encounter related to worker's compensation claim; Ankle sprain; Smoking   Author:   Serge Millan MD      Chief Complaint   2018 3:19 PM CDT    pt here for work comp fu, is still wearing the ankle brace on right ankle, feels it protects it      History of Present Illness   see chief complaint as noted above and confirmed with the patient     57 year old female present's today for work comp follow up injury. Beronica injured her right ankle at work on 2018, she was diagnosed with tendonitis of the right ankle, she was given an airsplint. She is doing well, she continues to wear the airsplint as she feel's it protects her ankle from being further injured, she say's there was a day when a child at work accidently ran into her and did hit her ankle, she was grateful she had the airsplint on to protect her ankle. She denies any pain in the ankle, she has been doing excercises like writing the alphabet with her ankle.       Review of Systems   Constitutional:  No fever, No chills.    Eye   Ear/Nose/Mouth/Throat:  No nasal congestion, No sore throat.    Respiratory:  No shortness of breath, No cough.    Cardiovascular   Breast   Gastrointestinal:  No nausea, No vomiting, No diarrhea, No constipation.    Genitourinary:  No dysuria.    Gynecologic   Hematology/Lymphatics:  No bruising tendency, No swollen lymph glands.    Endocrine   Immunologic:  No recurrent fevers, No recurrent infections.    Musculoskeletal:  No muscle pain.    Integumentary:  No rash.    Neurologic:  No tingling, No headache.    Psychiatric   All other systems.     Health Status   Allergies.Medications.Problem list.   Histories   Past Medical History.Family History.Procedure history.Social History.   Physical Examination   Vital Signs   2018 3:19 PM CDT Peripheral  Pulse Rate 74 bpm    Pulse Site Radial artery    Systolic Blood Pressure 104 mmHg    Diastolic Blood Pressure 64 mmHg    Mean Arterial Pressure 77 mmHg    BP Site Right arm    Oxygen Saturation 94 %      Measurements from flowsheet : Measurements   7/18/2018 3:19 PM CDT Height Measured - Standard 66.5 in    Weight Measured - Standard 137.6 lb    BSA 1.71 m2    Body Mass Index 21.87 kg/m2      General:  Alert and oriented, No acute distress.    Eye:  Pupils are equal, round and reactive to light, Normal conjunctiva.    HENT:  Oral mucosa is moist.    Neck:  Supple.    Respiratory:  Respirations are non-labored.    Cardiovascular:  Normal rate, Regular rhythm, No edema.    Gastrointestinal:  Non-distended.    Musculoskeletal:  Normal gait.    Integumentary:  Warm, No rash.    Psychiatric:  Cooperative, Appropriate mood & affect, Normal judgment.       Review / Management   Results review      Impression and Plan       Diagnosis     Encounter related to worker's compensation claim (BGU25-ML Z02.6).     Ankle sprain (MHQ29-CA S93.409A).     Smoking (GAN22-QB F17.200).     Plan:  No further work restirctions, no longer in need of brace at work.     Enocouraged to continue excercises with the ankle.   counseled to quit smoking and discussed options.    Melinda TELLES Medical Assistant acted solely as a scribe for, and in presence of Dr. Serge Millan who performed the services.

## 2022-02-16 NOTE — NURSING NOTE
Comprehensive Intake Entered On:  10/4/2019 10:12 AM CDT    Performed On:  10/4/2019 10:03 AM CDT by Aleah Boo CMA               Summary   Chief Complaint :   c/o falling yesterday in her garage and hitting back of  head on cement floor, has laceration on back of head, did lose consciousness,  headache   Menstrual Status :   Postmenopausal   Weight Measured :   144.6 lb(Converted to: 144 lb 10 oz, 65.59 kg)    Height Measured :   66.5 in(Converted to: 5 ft 6 in, 168.91 cm)    Body Mass Index :   22.99 kg/m2   Body Surface Area :   1.75 m2   Systolic Blood Pressure :   102 mmHg   Diastolic Blood Pressure :   60 mmHg   Mean Arterial Pressure :   74 mmHg   Peripheral Pulse Rate :   66 bpm   BP Site :   Right arm   Pulse Site :   Radial artery   BP Method :   Manual   HR Method :   Manual   Temperature Tympanic :   98.3 DegF(Converted to: 36.8 DegC)    Aleah Boo CMA - 10/4/2019 10:03 AM CDT   Health Status   Allergies Verified? :   Yes   Medication History Verified? :   Yes   Immunizations Current :   No   Medical History Verified? :   No   Pre-Visit Planning Status :   Completed   Tobacco Use? :   Current every day smoker   Aleah Boo CMA - 10/4/2019 10:03 AM CDT   Consents   Consent for Immunization Exchange :   Consent Granted   Consent for Immunizations to Providers :   Consent Granted   Aleah Boo CMA - 10/4/2019 10:03 AM CDT   Meds / Allergies   (As Of: 10/4/2019 10:13:00 AM CDT)   Allergies (Active)   No known allergies  Estimated Onset Date:   Unspecified ; Created By:   Justine Roland; Reaction Status:   Active ; Category:   Drug ; Substance:   No known allergies ; Type:   Allergy ; Updated By:   Justine Roland; Source:   Paper Chart ; Reviewed Date:   10/4/2019 10:10 AM CDT        Medication List   (As Of: 10/4/2019 10:13:00 AM CDT)   Prescription/Discharge Order    citalopram  :   citalopram ; Status:   Prescribed ; Ordered As Mnemonic:   citalopram 40 mg oral tablet ; Simple  Display Line:   1 tab(s), Oral, daily, 30 tab(s), 0 Refill(s) ; Ordering Provider:   Serge Millan MD; Catalog Code:   citalopram ; Order Dt/Tm:   10/1/2019 10:52:26 AM CDT          ibuprofen  :   ibuprofen ; Status:   Prescribed ; Ordered As Mnemonic:   ibuprofen 800 mg oral tablet ; Simple Display Line:   1 tab(s), Oral, q8 hrs, 50 tab(s), 0 Refill(s) ; Ordering Provider:   Serge Millan MD; Catalog Code:   ibuprofen ; Order Dt/Tm:   10/1/2019 10:54:16 AM CDT

## 2022-02-16 NOTE — TELEPHONE ENCOUNTER
---------------------  From: Justine Redmond CMA (eRx Pool ([facility]))   To: JOSH Mishra Pool ([address]_[place] [facility] [place]);     Sent: 9/15/2020 11:30:14 AM CDT  Subject: FW: Medication Management   Due Date/Time: 9/16/2020 9:15:00 AM CDT           ------------------------------------------  From: [id number]  To: Serge Millan MD  Sent: September 15, 2020 9:15:05 AM CDT  Subject: Medication Management  Due: September 9, 2020 4:20:39 PM CDT     ** On Hold Pending Signature **     Dispensed Drug: citalopram (citalopram 40 mg oral tablet), Take 1 tablet by mouth once daily  Quantity: 90 tab(s)  Days Supply: 90  Refills: 0  Substitutions Allowed  Notes from Pharmacy:     ** On Hold Pending Signature **     Dispensed Drug: ibuprofen (ibuprofen 800 mg oral tablet), TAKE 1 TABLET BY MOUTH EVERY 8 HOURS  Quantity: 50 tab(s)  Days Supply: 17  Refills: 0  Substitutions Allowed  Notes from Pharmacy:  ---------------------------------------------------------------  From: Justine Redmond CMA (JOSH Message Pool ([address]))   To: Serge Millan MD;     Sent: 9/15/2020 11:38:32 AM CDT  Subject: FW: Medication Management   Due Date/Time: 9/16/2020 9:15:00 AM CDT     LV: 7/27/20 Covid exposure  Last px: 8/27/18  Was due visit in 8/2019. Has not made follow up appt  Last fill:3/16/20 90 tabs with 0 refills---------------------  From: Serge Millan MD   To: [facility]    Sent: 9/15/2020 12:19:26 PM CDT  Subject: FW: Medication Management     ** Submitted: **  Complete:ibuprofen (ibuprofen 800 mg oral tablet)   Signed by Serge Millan MD  9/15/2020 5:19:00 PM [facility]    ** Submitted: **  Complete:citalopram (citalopram 40 mg oral tablet)   Signed by Serge Millan MD  9/15/2020 5:19:00 PM [facility]    ** Approved with modifications: **  citalopram (Citalopram Hydrobromide 40 MG Oral Tablet)  Take 1 tablet by mouth once daily  Qty:  90 tab(s)        Days Supply:  90         Refills:  0          Substitutions Allowed     Route To [facility]       ** Approved with modifications: **  ibuprofen (Ibuprofen 800 MG Oral Tablet)  TAKE 1 TABLET BY MOUTH EVERY 8 HOURS  Qty:  50 tab(s)        Days Supply:  17        Refills:  0          Substitutions Allowed     Route To [facility]

## 2022-02-16 NOTE — TELEPHONE ENCOUNTER
---------------------  From: Justine Redmond CMA   To: Referral Coordinators Pool ([address]_[facility]);     Sent: 6/24/2021 4:34:13 PM CDT  Subject: General Message     FYI: Dr. Millan placed a order for a CT scan todayNoted.

## 2022-02-16 NOTE — TELEPHONE ENCOUNTER
Entered by Bisi Bashir CMA on October 01, 2019 10:54:51 AM CDT  ---------------------  From: Bisi Bashir CMA   To: [place] [id number]    Sent: 10/1/2019 10:54:51 AM CDT  Subject: Medication Management     ** Submitted: **  Order:ibuprofen (ibuprofen 800 mg oral tablet)  1 tab(s)  Oral  q8 hrs  Qty:  50 tab(s)        Days Supply:  17        Refills:  0          Substitutions Allowed     Route To [place] [address]    Signed by Bisi Bashir CMA  10/1/2019 10:54:00 AM    ** Not Approved:  **  ibuprofen (IBUPROFEN 800MG     TAB)  TAKE 1 TABLET BY MOUTH EVERY 8 HOURS  Qty:  50 tab(s)        Days Supply:  17        Refills:  2          Substitutions Allowed     Route To [place] [address]   Note from Pharmacy:  Please consider 90 day supplies to promote better adherence  Signed by Bisi Bashir CMA            ** Submitted: **  Order:citalopram (citalopram 40 mg oral tablet)  1 tab(s)  Oral  daily  Qty:  30 tab(s)        Refills:  0          Substitutions Allowed     Route To [place] [address]    Signed by Bisi Bashir CMA  10/1/2019 10:52:00 AM    ** Submitted: **  Complete:citalopram (citalopram 40 mg oral tablet)   Signed by Bisi Bashir CMA  10/1/2019 10:52:00 AM    ** Not Approved:  **  citalopram (CITALOPRAM 40MG     TAB)  TAKE 1 TABLET BY MOUTH ONCE DAILY  Qty:  90 tab(s)        Days Supply:  90        Refills:  3          Substitutions Allowed     Route To [place] [address]   Signed by Bisi Bashir CMA            ------------------------------------------  From: [place] [address]  To: Serge Millan MD  Sent: September 28, 2019 11:16:45 AM CDT  Subject: Medication Management  Due: September 29, 2019 11:16:45 AM CDT    ** On Hold Pending Signature **  Drug: ibuprofen (ibuprofen 800 mg oral tablet)  TAKE 1 TABLET BY MOUTH EVERY 8 HOURS  Quantity: 50 tab(s)     Days Supply: 0         Refills: 0  Substitutions Allowed  Notes from  Pharmacy:     Dispensed Drug: ibuprofen (ibuprofen 800 mg oral tablet)  TAKE 1 TABLET BY MOUTH EVERY 8 HOURS  Quantity: 50 tab(s)     Days Supply: 17        Refills: 2  Substitutions Allowed  Notes from Pharmacy: Please consider 90 day supplies to promote better adherence    ** On Hold Pending Signature **  Drug: citalopram (citalopram 40 mg oral tablet)  TAKE 1 TABLET BY MOUTH ONCE DAILY  Quantity: 90 tab(s)     Days Supply: 0         Refills: 0  Substitutions Allowed  Notes from Pharmacy:     Dispensed Drug: citalopram (citalopram 40 mg oral tablet)  TAKE 1 TABLET BY MOUTH ONCE DAILY  Quantity: 90 tab(s)     Days Supply: 90        Refills: 3  Substitutions Allowed  Notes from Pharmacy:   ------------------------------------------Refilled per protocol. RTC in place. Note also sent to pharmacy.

## 2022-02-16 NOTE — PROGRESS NOTES
Chief Complaint    Pt here today for f/up after passing out. She states that head still hurts.  History of Present Illness      58-year-old woman here with concussion and syncopal episode.  Several days ago she had been in a hot tub for over 2 hours.  She got out to the hot shower to wash off the hot tub and then was sitting outside smoking a cigarette.  She fell and hit her head although she does not have memory of she has a scratch in the back of her head and was seen.  She had a negative CT of the head and some normal labs.  Since then she has taken it easy until today and she felt okay today she went to work where she works as a .  Had a pretty busy physical day.  She had a headache got worse by noon she had to take some Tylenol and it stayed hurting through the rest of the afternoon.  She has not had any visual difficulties.  No chest pain or pressure.  No palpitations.  No lightheadedness or dizziness.  No balance issues.  No trouble with thinking or decision-making.  No extremity pain or tingling.       She has had an echo and Holter monitor ordered  Review of Systems      See HPI.  All other review of systems negative.  Physical Exam   Vitals & Measurements    T: 98.7   F (Tympanic)  BP: 100/66     HT: 66.5 in       Alert and oriented       Normal cognition        Good range of motion of the neck        Normal gait and balance        Breathing is nonlabored  Assessment/Plan       1. Concussion (S06.0X9A)         I explained how the headache is a concussion.  She needs to rest and I recommend at least the next 2 days off of work.  And while off work she should be resting her brain and allowing it to heal she seems to understand this concept and is agreed to take it easy and let me know if symptoms persist or any new symptoms present himself       2. Syncope (R55)         She has history that is certainly strongly suggest to orthostatic changes probably from the hot tub of hot shower and inadequate  oral intake.  I have explained to her that she is a smoker long-term and certainly risk for heart problems and will be reasonable to undergo further evaluation.  If you have any further episodes evaluation would be of increasing importance and she should stop smoking  Patient Information     Name:MYA MCDOWELL      Address:      [address] [place] [id number]     Sex:Female     YOB: 1960     [phone]     Emergency Contact:SERGO MCDOWELL     MRN:[id number]     FIN:[id number]     Location:[facility]     Date of Service:10/07/2019      Primary Care Physician:       Serge Millan MD, [phone]      Attending Physician:       Serge Millan MD, [phone]  Problem List/Past Medical History    Ongoing     Chronic diarrhea     Emphysema/COPD     Mild depression     Tobacco Abuse-Unspec    Historical     Pregnancy     Pregnancy     Pregnancy  Procedure/Surgical History     Colonoscopy (07/07/2011)      Comments: Repeat in 5-10 yrs. pending pathology.      IV sedation..     Mammography; bilateral (01/28/2009)     Flexible sigmoidoscope (05/02/2007)     D&C - Dilatation and curettage (03/21/2002)     Hysteroscopy (03/21/2002)     Breast augmentation (1996)     Tubal ligation (1984)     Pregnancy      Comments: full term X 3.  Medications    citalopram 40 mg oral tablet, 1 tab(s), Oral, daily    ibuprofen 800 mg oral tablet, 1 tab(s), Oral, q8 hrs    Tylenol Extra Strength, Oral, q6 hrs  Allergies    No known allergies  Social History    Smoking Status - 10/04/2019     Current every day smoker     Alcohol      Current, 1-2 times per week, 3 drinks/episode average. 4 drinks/episode maximum., 08/29/2018     Employment/School      Employed, Work/School description: /., 05/16/2017     Exercise      Exercise frequency: Daily. Exercise type: Walking., 05/16/2017     Home/Environment      Marital status: . Spouse/Partner name: MihaiJani  Risks in environment: Does not wear helmet, owns secured gun., 05/16/2017     Nutrition/Health      Type of diet: Regular., 03/15/2016     Sexual      Sexually active: No. Identifies as female, Sexual orientation: Straight or heterosexual., 08/29/2018     Substance Abuse      Past, Marijuana, 05/16/2017     Tobacco      Current, Cigarettes, 20 per day. 34 year(s)., 01/30/2015  Family History    Alzheimer's disease: Father.    Arthritis: Father.    CA - Breast cancer: Aunt (P).    CAD - Coronary artery disease: Father.    High cholesterol: Father.    Sister: History is negative    Brother: History is negative    Brother: History is negative    Daughter: History is negative    Daughter: History is negative    Daughter: History is negative  Immunizations      Vaccine Date Status      pneumococcal (PCV13) 05/16/2017 Given      pneumococcal (PPSV23) 03/15/2016 Given      tetanus/diphth/pertuss (Tdap) adult/adol 12/20/2012 Given      Td 07/26/2005 Recorded  Lab Results          Lab Results (Last 4 results within 90 days)           Sodium Level: 142 [135 mmol/L - 145 mmol/L] (10/04/19 11:23:00)          Potassium Level: 3.9 [3.5 mmol/L - 5 mmol/L] (10/04/19 11:23:00)          Chloride Level: 108 [98 mmol/L - 110 mmol/L] (10/04/19 11:23:00)          CO2 Level: 25 [21 mmol/L - 31 mmol/L] (10/04/19 11:23:00)          AGAP: 9 [5  - 18] (10/04/19 11:23:00)          Glucose Level: 83 [65 mg/dL - 100 mg/dL] (10/04/19 11:23:00)          BUN: 15 [8 mg/dL - 25 mg/dL] (10/04/19 11:23:00)          Creatinine Level: 0.89 [0.57 mg/dL - 1.11 mg/dL] (10/04/19 11:23:00)          BUN/Creat Ratio: 17 [10  - 20] (10/04/19 11:23:00)          eGFR : >60 (10/04/19 11:23:00)          eGFR Non-: >60 (10/04/19 11:23:00)          Calcium Level: 9.5 [8.5 mg/dL - 10.5 mg/dL] (10/04/19 11:23:00)          WBC: 8.7 [4.5  - 11] (10/04/19 11:23:00)          RBC: 4.39 [4  - 5.2] (10/04/19 11:23:00)          Hgb: 14.1 [12 g/dL  - 16 g/dL] (10/04/19 11:23:00)          Hct: 42.5 [33 % - 51 %] (10/04/19 11:23:00)          MCV: 97 [80 fL - 100 fL] (10/04/19 11:23:00)          MCH: 32.1 [26 pg - 34 pg] (10/04/19 11:23:00)          MCHC: 33.2 [32 g/dL - 36 g/dL] (10/04/19 11:23:00)          RDW: 12.9 [11.5 % - 15.5 %] (10/04/19 11:23:00)          Platelet: 299 [140  - 440] (10/04/19 11:23:00)          MPV: 9.9 [6.5 fL - 11 fL] (10/04/19 11:23:00)

## 2022-02-16 NOTE — TELEPHONE ENCOUNTER
Entered by Meena Petersen RN on October 07, 2019 1:42:53 PM CDT  OK      ---------------------  From: Jo Box (Appointment Pool ([address]))   To: [facility] ( [facility]);     Sent: 10/7/2019 12:51:24 PM CDT  Subject: RE: 24 Hour Holter           Entered by Jo Box on October 07, 2019 12:51:13 PM CDT  patient declined.  Has an appt with Dr. Millan this evening and wants to get his recommendation.      ---------------------  From: Ana Pittman RN ([facility] ( [address]))   To: [facility] ([facility]);     Sent: 10/4/2019 4:58:29 PM CDT  Subject: 24 Hour Holter     Please call to schedule 24 hour Holter per GJ. Needs next day appointment for removal.  Reply to this message when scheduled.    Thank you,    Ana KING

## 2022-02-16 NOTE — TELEPHONE ENCOUNTER
---------------------  From: Tri Andrews   To: Phone Messages Pool ([address]_[place] [facility] [place]);     Sent: 7/27/2020 10:50:20 AM CDT  Subject: General Message     PCP:   JOSH      Time of Call:  0932       Person Calling:  Pt  Phone number:  [phone]    Returned call at: 1040    Note:   LM but call dropped in the beginning of her message. I called her back and LM informing her of this and that she can call us back and LM again.     Last office visit and reason:  Saw JOSH today for COVID exposure via telemed.Pt called back and states everything has been taken care of. no need to call back.

## 2022-02-16 NOTE — TELEPHONE ENCOUNTER
---------------------  From: Sheela Shaw (Phone Messages Pool ([address]_[facility]))   Sent: 12/14/2020 5:15:26 PM CST  Subject: Rx     Phone Message    PCP:   JOSH                           Time of Call:  349m                                        Person Calling:  Pt  Phone number:      Returned call at: 514pm    Note:   Pt called stating that Rx is not at the pharmacy. I checked and it looks like it was sent, informed pt that I did resend it and to call us back if she runs into any other problems. She had no further questions at this time.     Last office visit and reason:  12/14/2020; Note not complete yet.

## 2022-02-16 NOTE — NURSING NOTE
"Comprehensive Intake Entered On:  9/22/2020 1:27 PM CDT    Performed On:  9/22/2020 1:19 PM CDT by Sukhjinder Holliday CMA               Summary   Chief Complaint :   Pt here for states she fell and hit her arm on a towel rack DOI 9-19.  Pt states she \"blacked out\" and doesnt remember falling.   Menstrual Status :   Postmenopausal   Weight Measured :   151 lb(Converted to: 151 lb 0 oz, 68.49 kg)    Height Measured :   66.5 in(Converted to: 5 ft 6 in, 168.91 cm)    Body Mass Index :   24 kg/m2   Body Surface Area :   1.79 m2   Systolic Blood Pressure :   120 mmHg   Diastolic Blood Pressure :   68 mmHg   Mean Arterial Pressure :   85 mmHg   Peripheral Pulse Rate :   64 bpm   BP Site :   Right arm   Pulse Site :   Radial artery   Temperature Tympanic :   98.8 DegF(Converted to: 37.1 DegC)    Respiratory Rate :   16 br/min   Sukhjinder Holliday CMA - 9/22/2020 1:19 PM CDT   Health Status   Allergies Verified? :   Yes   Medication History Verified? :   Yes   Immunizations Current :   No   Medical History Verified? :   Yes   Pre-Visit Planning Status :   Not completed   Tobacco Use? :   Current every day smoker   Tobacco Cessation Review :   Not ready to quit   Sukhjinder Holliday CMA - 9/22/2020 1:19 PM CDT   Meds / Allergies   (As Of: 9/22/2020 1:27:34 PM CDT)   Allergies (Active)   No known allergies  Estimated Onset Date:   Unspecified ; Created By:   Justine Roland; Reaction Status:   Active ; Category:   Drug ; Substance:   No known allergies ; Type:   Allergy ; Updated By:   Justine Roland; Source:   Paper Chart ; Reviewed Date:   9/22/2020 1:24 PM CDT        Medication List   (As Of: 9/22/2020 1:27:34 PM CDT)   Prescription/Discharge Order    varenicline  :   varenicline ; Status:   Prescribed ; Ordered As Mnemonic:   Chantix 1 mg oral tablet ; Simple Display Line:   1 mg, 1 tab(s), Oral, bid, 60 tab(s), 5 Refill(s) ; Ordering Provider:   Serge Millan MD; Catalog Code:   varenicline ; Order Dt/Tm:   " 8/10/2020 2:15:19 PM CDT          ibuprofen  :   ibuprofen ; Status:   Prescribed ; Ordered As Mnemonic:   ibuprofen 800 mg oral tablet ; Simple Display Line:   1 tab(s), Oral, q8 hrs, 50 tab(s), 0 Refill(s) ; Ordering Provider:   Serge Millan MD; Catalog Code:   ibuprofen ; Order Dt/Tm:   9/15/2020 12:19:21 PM CDT          citalopram  :   citalopram ; Status:   Prescribed ; Ordered As Mnemonic:   citalopram 40 mg oral tablet ; Simple Display Line:   1 tab(s), Oral, daily, 90 tab(s), 0 Refill(s) ; Ordering Provider:   Serge Millan MD; Catalog Code:   citalopram ; Order Dt/Tm:   9/15/2020 12:19:20 PM CDT            ID Risk Screen   Recent Travel History :   No recent travel   Family Member Travel History :   No recent travel   Other Exposure to Infectious Disease :   Unknown   Sukhjinder Holliday CMA - 9/22/2020 1:19 PM CDT   Social History   Social History   (As Of: 9/22/2020 1:27:34 PM CDT)   Alcohol:        Current, 1-2 times per week, 3 drinks/episode average.  4 drinks/episode maximum.   (Last Updated: 8/29/2018 8:49:40 AM CDT by Patricia Ohara)          Tobacco:        Current, Cigarettes, 20 per day.  34 year(s).   (Last Updated: 1/30/2015 3:58:41 PM CST by Sukhjinder Holliday CMA)          Substance Abuse:        Past, Marijuana   (Last Updated: 5/16/2017 10:32:03 AM CDT by Patricia Ohara)          Employment/School:        Employed, Work/School description: /.   (Last Updated: 5/16/2017 10:31:33 AM CDT by Patricia Ohara)          Home/Environment:        Marital status: .  Spouse/Partner name: Mihai.  Risks in environment: Does not wear helmet, owns secured gun.   (Last Updated: 5/16/2017 10:32:28 AM CDT by Patricia Ohara)          Nutrition/Health:        Type of diet: Regular.   (Last Updated: 3/15/2016 12:59:49 PM CDT by [place] MA, Melinda M)          Exercise:        Exercise frequency: Daily.  Exercise type: Walking.   (Last Updated: 5/16/2017 10:32:54 AM CDT by Lev  Patricia)          Sexual:        Sexually active: No.  Identifies as female, Sexual orientation: Straight or heterosexual.   (Last Updated: 8/29/2018 8:50:40 AM CDT by Patricia Ohara)

## 2022-02-16 NOTE — TELEPHONE ENCOUNTER
---------------------  From: Roxie Barrientos CMA (Phone Messages Pool ([id number]))   To: [facility] Pool ([id number]);     Sent: 8/10/2020 1:06:38 PM CDT  Subject: Phone Message, chantix.     Phone Message    PCP:   JOSH      Time of Call:  1224       Person Calling:  sandra Topete.  Phone number:  [phone].    Returned call at: 1300    Note:   Calls for Chantix prescription. To quit smoking sent to [facility] in [place]. Please advise.    Last office visit and reason:  07/27/2020 COVID-19 exposure GTG.---------------------  From: Tri Andrews ([facility] Message Pool ([id number]))   To: Serge Millan MD;     Sent: 8/10/2020 2:12:52 PM CDT  Subject: FW: Phone Message, chantix.     has never been prescribed before.  ** Submitted: **  Order:varenicline (Chantix 1 mg oral tablet)  1 tab(s)  Oral  bid  Qty:  60 tab(s)        Refills:  5          Substitutions Allowed     Route To Pharmacy - [facility]    Signed by Serge Millan MD  8/10/2020 7:15:00 PM [facility]---------------------  From: Serge Millan MD   To: [facility] Message Pool ([id number]);     Sent: 8/10/2020 2:16:12 PM CDT  Subject: RE: Phone Message, chantix.     sent in rx.  may want to take 1 per day for the first weekNotified pt by VM on identified VMAlva in the message below is a different pt's mom, this pt's mom did not call, the pt called.

## 2022-02-16 NOTE — PROGRESS NOTES
Chief Complaint    verbal consent given for telemed visit.   dx w/ COVID, employer wants pt to get tested, has been asymptomatic, works for school district, has been working.   Patient is being evaluated via a billable telephone visit.   This telephone visit will be conducted via a call between you and your physician.   The patient has been notified of the following:   Today's visit was conducted via telephone due to the COVID-19 pandemic. Patient's consent to telephone visit was obtained and documented.  We have found that certain health care needs can be provided without need for a physical exam.  This service lets us provide the care your need with a short telephone conversation.  If a prescription is necessary, we can send it directly to your pharmacy.  If lab work is needed, we can place an order in your chart and you can schedule an appointment for the test/tests at a later time   Call Start Time: 0845   Call End Time: 0855  History of Present Illness       tested positive for COVID-19 about three weeks ago and employer wants her tested.  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  She works in [place].   Review of Systems          ROS reviewed and negative except for symptoms noted in HPI.  Assessment/Plan       1. Exposure to COVID-19 virus (Z20.828)        Testing ordered        Case being followed by public health        Self quarantine until tests results return  Patient Information     Name:MYA MCDOWELL      Address:      [address] [place] [id number]     Sex:Female     YOB: 1960     [phone]     Emergency Contact:SERGO MCDOWELL     MRN:[id number]     FIN:[id number]     Location:[facility]     Date of Service:07/27/2020      Primary Care Physician:       Serge Millan MD, [phone]      Attending Physician:       López Sandhu MD, [phone]  Problem List/Past Medical History    Ongoing     Chronic diarrhea      Emphysema/COPD     Mild depression     Tobacco Abuse-Unspec    Historical     Pregnancy     Pregnancy     Pregnancy  Procedure/Surgical History     Colonoscopy (07/07/2011)      Comments: Repeat in 5-10 yrs. pending pathology.      IV sedation..     Mammography; bilateral (01/28/2009)     Flexible sigmoidoscope (05/02/2007)     D&C - Dilatation and curettage (03/21/2002)     Hysteroscopy (03/21/2002)     Breast augmentation (1996)     Tubal ligation (1984)     Pregnancy      Comments: full term X 3.  Medications    citalopram 40 mg oral tablet, 1 tab(s), Oral, daily    ibuprofen 800 mg oral tablet, 1 tab(s), Oral, q8 hrs  Allergies    No known allergies  Social History    Smoking Status - 07/27/2020     Current every day smoker     Alcohol      Current, 1-2 times per week, 3 drinks/episode average. 4 drinks/episode maximum., 08/29/2018     Employment/School      Employed, Work/School description: /., 05/16/2017     Exercise      Exercise frequency: Daily. Exercise type: Walking., 05/16/2017     Home/Environment      Marital status: . Spouse/Partner name: Mihai. Risks in environment: Does not wear helmet, owns secured gun., 05/16/2017     Nutrition/Health      Type of diet: Regular., 03/15/2016     Sexual      Sexually active: No. Identifies as female, Sexual orientation: Straight or heterosexual., 08/29/2018     Substance Abuse      Past, Marijuana, 05/16/2017     Tobacco      Current, Cigarettes, 20 per day. 34 year(s)., 01/30/2015  Family History    Alzheimer's disease: Father.    Arthritis: Father.    CA - Breast cancer: Aunt (P).    CAD - Coronary artery disease: Father.    High cholesterol: Father.    Sister: History is negative    Brother: History is negative    Brother: History is negative    Daughter: History is negative    Daughter: History is negative    Daughter: History is negative  Immunizations      Vaccine Date Status          pneumococcal (PCV13) 05/16/2017 Given           pneumococcal (PPSV23) 03/15/2016 Given          tetanus/diphth/pertuss (Tdap) adult/adol 12/20/2012 Given          Td 07/26/2005 Recorded

## 2022-02-16 NOTE — PROGRESS NOTES
Patient:   MYA MCDOWELL            MRN: [id number]            FIN: [id number]               Age:   57 years     Sex:  Female     :  1960   Associated Diagnoses:   None   Author:   Justine Redmond CMA       -   Today's date:    2018.        -   To whom it may concern:        This patient Was seen in my office on  2018.  .  He/ she may return to work, with the following restrictions: minimal climbing ladders, and rest ankle 2-3 times a day for 10min each..  Please contact me if you have any questions or concerns.      -   Sincerely,

## 2022-02-16 NOTE — PROGRESS NOTES
Patient:   MYA MCDOWELL            MRN: [id number]            FIN: [id number]               Age:   57 years     Sex:  Female     :  1960   Associated Diagnoses:   Right ankle pain   Author:   Serge Millan MD      Visit Information      Date of Service: 2018 01:15 pm  [facility]  Encounter#: [id number]      Primary Care Provider (PCP):  Serge Millan MD    NPI# [id number]      Referring Provider:  Serge Millan MD    NPI# [id number]      Chief Complaint   2018 1:30 PM CDT    c/o right ankle pain since .      History of Present Illness   Chief c omplaint confirmed with patient. States that she was walking at work when her right ankle began to have sharp shooting pain down the lateral aspect. 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 Pain persisted throughout the week despite ice and BID use of 800 mg ibuprofen. Pain is exacerbated by walking. Going down stairs backwards seems to be less strenuous on the area. 4 days ago the pain seemed to worsen after a child landed on the ankle at work. Pain now is additionally in the top of her right foot and radiates up her mid calf. Denies any surgiers or injuries to the right ankle. No numbness, tingling, or breaks in skin near ankle.       Review of Systems   Constitutional:  No fever, No chills.    Musculoskeletal:  Negative except as documented in history of present illness.    Integumentary:  No rash, No breakdown, No skin lesion.    Neurologic:  No numbness, No tingling.       Health Status   Allergies:    Allergic Reactions (Selected)  No known allergies      Histories   Social History:        Alcohol Assessment            Current, 2 times per week, 6 drinks/episode average.      Tobacco Assessment            Current, Cigarettes, 20 per day.  34 year(s).      Substance Abuse Assessment            Past, Marijuana      Employment and Education  Assessment            Employed, Work/School description: /.      Home and Environment Assessment            Marital status: .  Spouse/Partner name: Mihai.  Risks in environment: Does not wear helmet, owns               secured gun.      Nutrition and Health Assessment            Type of diet: Regular.      Exercise and Physical Activity Assessment            Exercise frequency: Daily.  Exercise type: Walking.      Sexual Assessment            Sexually active: No.  Sexual orientation: Heterosexual.        Physical Examination   Vital Signs   4/30/2018 1:30 PM CDT Peripheral Pulse Rate 72 bpm    Systolic Blood Pressure 114 mmHg    Diastolic Blood Pressure 62 mmHg    Mean Arterial Pressure 79 mmHg      Measurements from flowsheet : Measurements   4/30/2018 1:30 PM CDT Height Measured - Standard 66.5 in    Weight Measured - Standard 145.4 lb    BSA 1.76 m2    Body Mass Index 23.11 kg/m2      General:  Alert and oriented, No acute distress.    Respiratory:  Lungs are clear to auscultation, Respirations are non-labored, Breath sounds are equal.    Cardiovascular:  Normal rate, Regular rhythm.    Musculoskeletal:  significant tenderness to palpation of lateral aspect of ankle and dorsum of foot. No calf tenderness. No heel tenderness. Capillary refill <2 seconds on all toes. Pain with eversion of ankle. Flexion, extension, and inversion unremarkable. Pedal and tibial pulses intact. 5/5 strengths in foot. .    Integumentary:  Warm, Dry, Pink, Intact.    Psychiatric:  Appropriate mood & affect.       Review / Management   Radiology results   X-ray   Findings: Negative right ankle. No fracture or dislocation.       Impression and Plan   Diagnosis     Right ankle pain (KVY79-IV M25.571).     Plan:  Discussed x ray results with patient. This is likely an ankle tendonitis due to overuse during plowing. Encouraged her to stretch the ankle every day for the next 2 weeks by drawing the alphabet  with her right foot. Also encouraged ibuprofen as needed for pain and to take with meals. Provided an air brace to wear in her shoe during work. .    Patient Instructions:       Counseled: Patient, Regarding diagnosis, Regarding treatment, Activity, Verbalized understanding.

## 2022-02-16 NOTE — PROGRESS NOTES
Patient:   MYA MCDOWELL            MRN: [id number]            FIN: [id number]               Age:   59 years     Sex:  Female     :  1960   Associated Diagnoses:   Shingles   Author:   Serge Millan MD      Visit Information      Date of Service: 2020 03:38 pm  Performing Location: [facility]  Encounter#: [id number]      Primary Care Provider (PCP):  Serge Millan MD    NPI# [id number]      Referring Provider:  Serge Millan MD    NPI# [id number]      Chief Complaint   2020 3:41 PM CST   c/o right shoulder pain with rash, numbness and tingling down arm. Fell in Sept in same area. Taking ibuprofen 800mg q 4 hrs but only lasts for about 3 hrs        Subjective   Chief complaint 2020 3:41 PM CST   c/o right shoulder pain with rash, numbness and tingling down arm. Fell in Sept in same area. Taking ibuprofen 800mg q 4 hrs but only lasts for about 3 hrs  .  Additional information pain started yesterday  rash started last night.        Health Status   Allergies:    Allergic Reactions (Selected)  No known allergies   Medications:  (Selected)   Prescriptions  Prescribed  citalopram 40 mg oral tablet: = 1 tab(s), Oral, daily, # 90 tab(s), 0 Refill(s), Type: Maintenance, Pharmacy: [facility], Take 1 tablet by mouth once daily, 66.5, in, 10/07/19 16:08:00 CDT, Height Measured, 144.6, lb, 10/04/19 10:03:00 CDT, Weight Measured  ibuprofen 800 mg oral tablet: = 1 tab(s), Oral, q8 hrs, # 50 tab(s), 0 Refill(s), Type: Acute, Pharmacy: [facility], TAKE 1 TABLET BY MOUTH EVERY 8 HOURS, 66.5, in, 10/07/19 16:08:00 CDT, Height Measured, 144.6, lb, 10/04/19 10:03:00 CDT, Weight Measured  valACYclovir 1 g oral tablet: = 1 tab(s) ( 1 gm ), Oral, q8 hrs, x 7 day(s), # 21 tab(s), 0 Refill(s), Type: Acute, Pharmacy: [facility], 1 tab(s) Oral q8 hrs,x7 day(s), 66.5, in, 20 15:41:00 CST, Height Measured, 154.8, lb, 20 15:41:00 CST, Weight  Measured,    Medications          *denotes recorded medication          citalopram 40 mg oral tablet: 1 tab(s), Oral, daily, 90 tab(s), 0 Refill(s).          ibuprofen 800 mg oral tablet: 1 tab(s), Oral, q8 hrs, 50 tab(s), 0 Refill(s).          valACYclovir 1 g oral tablet: 1 gm, 1 tab(s), Oral, q8 hrs, for 7 day(s), 21 tab(s), 0 Refill(s).       Problem list:    All Problems (Selected)  Chronic diarrhea / 939406813 / Confirmed  Tobacco Abuse-Unspec / 305.1 / Confirmed  Mild depression / 615797462 / Confirmed  Emphysema/COPD / 811057926 / Confirmed      Objective   Vital Signs   12/14/2020 3:41 PM CST Peripheral Pulse Rate 72 bpm    Systolic Blood Pressure 124 mmHg    Diastolic Blood Pressure 66 mmHg    Mean Arterial Pressure 85 mmHg      Measurements from flowsheet : Measurements   12/14/2020 3:41 PM CST Height Measured - Standard 66.5 in    Weight Measured - Standard 154.8 lb    BSA 1.81 m2    Body Mass Index 24.61 kg/m2      cluster of small blisters on lateral chest on cusp of axilla      Impression and Plan   Assessment and Plan:          Diagnosis: Shingles (JYA49-SJ B02.9).    Orders      (Selected)   Prescriptions  Prescribed  valACYclovir 1 g oral tablet: = 1 tab(s) ( 1 gm ), Oral, q8 hrs, x 7 day(s), # 21 tab(s), 0 Refill(s), Type: Acute, Pharmacy: [facility], 1 tab(s) Oral q8 hrs,x7 day(s), 66.5, in, 12/14/20 15:41:00 CST, Height Measured, 154.8, lb, 12/14/20 15:41:00 CST, Weight Measured.     reviewed topical anesthetics  Reviewed expected course, what to watch for and when to return..     .

## 2022-02-16 NOTE — TELEPHONE ENCOUNTER
Entered by Smitha Oneill CMA on May 21, 2021 9:53:04 AM CDT  ---------------------  From: Smitha Oneill CMA   To: [place] [id number]    Sent: 5/21/2021 9:53:04 AM CDT  Subject: Medication Management     ** Submitted: **  Order:citalopram (citalopram 40 mg oral tablet)  1 tab(s)  Oral  daily  *NEEDS APPT FOR FURTHER REFILLS*.  Qty:  30 tab(s)        Days Supply:  30        Refills:  0          Substitutions Allowed     Route To [place] [address]    Signed by Smitha Oneill CMA  5/21/2021 2:52:00 PM [facility]    ** Submitted: **  Complete:citalopram (citalopram 40 mg oral tablet)   Signed by Smitha Oneill CMA  5/21/2021 2:53:00 PM [facility]    ** Not Approved:  **  citalopram (Citalopram Hydrobromide 40 MG Oral Tablet)  TAKE 1 TABLET BY MOUTH ONCE DAILY*NEEDS APPT FOR FURTHER REFILLS*  Qty:  30 tab(s)        Days Supply:  30        Refills:  0          Substitutions Allowed     Route To [place] [address]   Signed by Smitha Oneill CMA            ------------------------------------------  From: [place] [address]  To: Serge Millan MD  Sent: May 19, 2021 2:42:35 PM CDT  Subject: Medication Management  Due: May 14, 2021 8:11:22 PM CDT     ** On Hold Pending Signature **     Dispensed Drug: citalopram (citalopram 40 mg oral tablet), TAKE 1 TABLET BY MOUTH ONCE DAILY*NEEDS APPT FOR FURTHER REFILLS*  Quantity: 30 tab(s)  Days Supply: 30  Refills: 0  Substitutions Allowed  Notes from Pharmacy:  ------------------------------------------Patient notified she is due for an appointment prior to further refills, and that a limited supply was sent in. Pharmacy note sent that pt is due for appt. 30 day supply sent.

## 2022-02-16 NOTE — PROGRESS NOTES
Patient:   MYA MCDOWELL            MRN: [id number]            FIN: [id number]               Age:   56 years     Sex:  Female     :  1960   Associated Diagnoses:   Acute sinusitis   Author:   Samantha Mujica      Chief Complaint   2017 7:08 PM CST    Pt here for ST, cough, and congestion        History of Present Illness             The patient presents with sinus problem.  The sinus problem is located in the frontal sinus and maxillary sinus.  The sinus problem is characterized by nasal congestion and headache.  The severity of the sinus problem is moderate.  The sinus problem fluctuates in intensity and is worsening.  The sinus problem has lasted for 8 week(s).  The context of the sinus problem: occurred in association with illness.  Exacerbating factors consist of.  Relieving factors consist of antihistamine.  Associated symptoms consist of cough, sore throat and denies fever.        Review of Systems            Health Status   Allergies:    Allergic Reactions (Selected)  No known allergies   Medications:  (Selected)   Prescriptions  Prescribed  Amoxil 875 mg oral tablet: 1 tab(s) ( 875 mg ), PO, BID, # 28 tab(s), 0 Refill(s), Type: Maintenance, Pharmacy: [facility], 1 tab(s) po bid,x14 day(s)  citalopram 40 mg oral tablet: 1 tab(s) ( 40 mg ), po, daily, # 90 tab(s), 3 Refill(s), Type: Maintenance, Pharmacy: [facility], 1 tab(s) po daily  ibuprofen 800 mg oral tablet: 1 tab(s) ( 800 mg ), po, daily, # 100 tab(s), 3 Refill(s), Pharmacy: [facility], 1 tab(s) po daily   Problem list:    All Problems  Mild Depression / ICD-9-.21 / Confirmed  Tobacco Abuse-Unspec / ICD-9-.1 / Confirmed  Chronic Diarrhea / ICD-9-.91 / Confirmed      Histories   Past Medical History:    Active  Chronic Diarrhea (787.91)  Tobacco Abuse-Unspec (305.1)  Mild Depression (296.21)   Family History:    CA - Breast cancer  Aunt (P)  Comments:  2012 7:09 PM - Foster  Brittny  Late 40s or early 50s.  Sister  Alzheimer's disease  Father  CAD - Coronary artery disease  Father     Procedure history:    Colonoscopy (SNOMED CT 612741837) performed by Prateek Marie MD on 7/7/2011 at 50 Years.  Comments:  12/16/2011 9:03 AM - MichaelRosanna marina  Repeat in 5-10 yrs. pending pathology.   IV sedation.  Flexible sigmoidoscope (SNOMED CT 338686874) on 5/2/2007 at 46 Years.  Hysteroscopy (SNOMED CT 503389262) performed by Charlie Kim MD on 3/21/2002 at 41 Years.  D&C - Dilatation and curettage (SNOMED CT 8342739503) on 3/21/2002 at 41 Years.  Breast augmentation (SNOMED CT 3577143169) in 1996 at 36 Years.  Tubal ligation (SNOMED CT 942653799) in 1984 at 24 Years.  Pregnancy (SNOMED CT 203545848).  Comments:  4/15/2010 3:54 PM - Justine Roland  full term X 3   Social History:        Alcohol Assessment            1-2 times per week, 3 drinks/episode average.      Tobacco Assessment            Current, Cigarettes, 20 per day.  34 year(s).      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      Home and Environment Assessment            Marital status: .  Spouse/Partner name: Jayme Christensen.  Risks in environment: Does not wear helmet.                     Comments:                      05/29/2012 - Brittny Hutchison                     Remarried 1998.      Nutrition and Health Assessment            Type of diet: Regular.      Exercise and Physical Activity Assessment            Exercise frequency: 3-4 times/week.  Exercise type: Walking.      Sexual Assessment            Sexually active: No.  Sexual orientation: Heterosexual.      Other Assessment            [facility]  [facility]        Physical Examination   Vital Signs   1/19/2017  7:08 PM CST Temperature Tympanic 99.2 DegF    Peripheral Pulse Rate 68 bpm    Pulse Site Radial artery    HR Method Manual    Systolic Blood Pressure 110 mmHg    Diastolic Blood Pressure 58 mmHg  LOW    Mean Arterial Pressure 75 mmHg    BP Site Right arm      Measurements from flowsheet : Measurements   1/19/2017 7:08 PM CST Height Measured - Standard 66.5 in    Weight Measured - Standard 135.4 lb    BSA 1.7 m2    Body Mass Index 21.52 kg/m2      General:  Alert and oriented, No acute distress.    Eye:  Normal conjunctiva.    HENT:  Tympanic membranes are clear, Normal hearing, Oral mucosa is moist, No pharyngeal erythema.    Neck:  Non-tender, No lymphadenopathy.    Respiratory:  Lungs are clear to auscultation, Respirations are non-labored, Breath sounds are equal.    Cardiovascular:  Normal rate, Regular rhythm, No murmur.    Integumentary:  Warm, Dry, Pink, No rash.    Neurologic:  Alert, Oriented.    Psychiatric:  Cooperative, Appropriate mood & affect.       Impression and Plan   Diagnosis     Acute sinusitis (KDH50-NG J01.90).     Orders     Orders (Selected)   Prescriptions  Prescribed  Amoxil 875 mg oral tablet: 1 tab(s) ( 875 mg ), PO, BID, # 28 tab(s), 0 Refill(s), Type: Maintenance, Pharmacy: [facility], 1 tab(s) po bid,x14 day(s).     Use a mucolytic (like guaifenesin/Mucinex/Robitussin) and saline nasal spray, a decongestant (pseudoephedrine--kept behind the counter--if not contraindicated), return if not better.  If you were prescribed an antibiotic,  a probiotic at the pharmacy to help prevent diarrhea and yeast infections, antibiotics can cause this.      .

## 2022-02-16 NOTE — PROGRESS NOTES
Patient:   MYA MCDOWELL            MRN: [id number]            FIN: [id number]               Age:   58 years     Sex:  Female     :  1960   Associated Diagnoses:   Brain concussion; Syncope   Author:   López Jordan MD      Visit Information      Date of Service: 10/04/2019 09:51 am  Performing Location: [facility]  Encounter#: [id number]      Primary Care Provider (PCP):  Serge Millan MD    NPI# [id number]      Referring Provider:  López Jordan MD    NPI# [id number]      Chief Complaint   10/4/2019 10:03 AM CDT   c/o falling yesterday in her garage and hitting back of  head on cement floor, has laceration on back of head, did lose consciousness,  headache      History of Present Illness   Sitting in chair (hot tub 2 hrs prior) and went for a cigarrette and then passed out. Had similar episode 10-15 years ago when hot out. Passed out for a few seconds. Had a scalp laceration. Happened last night. Tried to go to work but felt foggy.   Adacel 2012      Review of Systems   Eye:  No visual disturbances.    Ear/Nose/Mouth/Throat:  No decreased hearing.    Respiratory:  No shortness of breath.    Cardiovascular:  No chest pain.    Gastrointestinal:  No vomiting.       Health Status   Allergies:    Allergic Reactions (Selected)  No known allergies   Medications:  (Selected)   Prescriptions  Prescribed  citalopram 40 mg oral tablet: = 1 tab(s), Oral, daily, # 30 tab(s), 0 Refill(s), Type: Maintenance, Pharmacy: [facility], appt due for further refills  ibuprofen 800 mg oral tablet: = 1 tab(s), Oral, q8 hrs, # 50 tab(s), 0 Refill(s), Type: Acute, Pharmacy: [facility], due for appt for further refills, 1 tab(s) Oral q8 hrs   Problem list:    All Problems  Chronic diarrhea / SNOMED CT 409779885 / Confirmed  Mild depression / SNOMED CT 034948835 / Confirmed  Emphysema/COPD / SNOMED CT 985952917 / Confirmed  Tobacco Abuse-Unspec / ICD-9-.1 / Confirmed  Resolved:  Pregnancy / SNOMED CT 807151972  Resolved: Pregnancy / SNOMED CT 599588126  Resolved: Pregnancy / SNOMED CT 638520670      Histories   Procedure history:    Colonoscopy (SNOMED CT 638849028) performed by Prateek Marie MD on 7/7/2011 at 50 Years.  Comments:  12/16/2011 9:03 AM CST - Rosanna Rodriguez  Repeat in 5-10 yrs. pending pathology.   IV sedation.  Flexible sigmoidoscope (SNOMED CT 812164666) on 5/2/2007 at 46 Years.  Hysteroscopy (SNOMED CT 572577798) performed by Charlie Kim MD on 3/21/2002 at 41 Years.  D&C - Dilatation and curettage (SNOMED CT 3566089941) on 3/21/2002 at 41 Years.  Breast augmentation (SNOMED CT 3030042751) in 1996 at 36 Years.  Tubal ligation (SNOMED CT 729556511) in 1984 at 24 Years.  Pregnancy (SNOMED CT 398303995).  Comments:  4/15/2010 3:54 PM CDT - Justine Roland  full term X 3     Social History: . Works [employer]       Physical Examination   Vital Signs   10/4/2019 10:03 AM CDT Temperature Tympanic 98.3 DegF    Peripheral Pulse Rate 66 bpm    Pulse Site Radial artery    HR Method Manual    Systolic Blood Pressure 102 mmHg    Diastolic Blood Pressure 60 mmHg    Mean Arterial Pressure 74 mmHg    BP Site Right arm    BP Method Manual      Measurements from flowsheet : Measurements   10/4/2019 10:03 AM CDT Height Measured - Standard 66.5 in    Weight Measured - Standard 144.6 lb    BSA 1.75 m2    Body Mass Index 22.99 kg/m2      General:  Alert and oriented, No acute distress.    Eye:  Pupils are equal, round and reactive to light, Extraocular movements are intact, Normal conjunctiva.    HENT:  Tympanic membranes are clear.    Neck:  No lymphadenopathy.    Respiratory:  Lungs are clear to auscultation.    Cardiovascular:  Normal rate, Regular rhythm.    Gastrointestinal:  Soft, Non-tender, Non-distended.    Musculoskeletal:  Normal range of motion, Normal strength, Normal gait.    Neurologic:  No focal deficits, Cranial Nerves II-XII  are grossly intact.    Psychiatric:  Appropriate mood & affect, Normal judgment.    Small posterior scalp laceration without gaping      Review / Management   Word recall: 4 of 5 words  Digits Backwards: 5  Vestibular testing normal    SCAT2: 13pts 39 symptoms    [place] CT Head rule with no indication for CT head  EKG: Sinus rhythm. Normal QTc and MD interval with no qwaves or ST changes. Good R wave progression       Results review:  Lab results   10/4/2019 11:23 AM CDT Sodium Level 142 mmol/L    Potassium Level 3.9 mmol/L    Glucose Level 83 mg/dL    Creatinine Level 0.89 mg/dL    WBC 8.7    Hgb 14.1 g/dL    Platelet 299     .       Impression and Plan   Diagnosis     Brain concussion (HZL63-XO S06.0X9A).     Syncope (SZU45-UX R55).     Concussion: Discussed concussion and head injury precautions. May use tylenol for headache. Rest as much as possible. Decrease screen time. Attend school as able.  Syncope: Syncope without warning. Schedule 24 hr holter, Echocardiogram and Cardiology referral

## 2022-02-16 NOTE — PROGRESS NOTES
"   Patient:   MYA MCDOWELL            MRN: [id number]            FIN: [id number]               Age:   57 years     Sex:  Female     :  1960   Associated Diagnoses:   Well adult exam; Mild depression; Emphysema/COPD   Author:   Serge Millan MD      Chief Complaint   2018 3:46 PM CDT    Annual physical, discuss some urinary concerns      History of Present Illness   see chief complaint as noted above and confirmed with the patient     57 year old female presents for an annual exam. Overall she is doing pretty well. She is working a full time job.     Urinary concern: Was at [employer] last weekend, was drinking a lot of water and stood up to go to the bathroom, did not feel the urge to go just wanted to head over there and try to go, on her way to go the urine ran down her leg and she was unable to stop it. This occured once and has not happened since. No burning with urination, no blood in urine, no fevers, no nausea, and no vomiting.     Mammogram: Last one 17, is due for one now.     Colonscopy: Last Colonoscopy in  and is to repeat in .     Pap smear: Last one done 2015 and results came back normal.     Immunizations: Has had both pneumonia vaccines, last Tdap in , would be a candidate for Shingrix \"Shingles\" vaccine at the pharmacy.       Review of Systems   Constitutional:  No fever, No fatigue.    Ear/Nose/Mouth/Throat:  No nasal congestion, No sore throat.    Respiratory:  No shortness of breath, No cough, No sputum production, No wheezing.    Cardiovascular:  No chest pain.    Gastrointestinal:  No nausea, No vomiting, No diarrhea.    Genitourinary:  No dysuria, No hematuria, No change in urine stream.    Musculoskeletal:  No back pain.    Integumentary:  No rash.    Neurologic:  Alert and oriented X4, No headache.    Psychiatric:  Depression, Depression: Well controlled with medications , No anxiety.       Health Status   Allergies:    Allergic Reactions " (Selected)  No known allergies   Medications:  (Selected)   Prescriptions  Prescribed  citalopram 40 mg oral tablet: 1 tab(s) ( 40 mg ), po, daily, # 30 tab(s), 0 Refill(s), Type: Maintenance, Pharmacy: [facility], Pt needs visit for further refills, 1 tab(s) Oral daily   Problem list:    All Problems  Tobacco Abuse-Unspec / ICD-9-.1 / Confirmed  Emphysema/COPD / SNOMED CT 596255904 / Confirmed  Mild depression / SNOMED CT 435427337 / Confirmed  Chronic diarrhea / SNOMED CT 421419343 / Confirmed  Resolved: Pregnancy / SNOMED CT 924335605  Resolved: Pregnancy / SNOMED CT 271488886  Resolved: Pregnancy / SNOMED CT 729408616      Histories   Past Medical History:    Active  Chronic diarrhea (534023140)  Tobacco Abuse-Unspec (305.1)  Mild depression (512781782)  Resolved  Pregnancy (653785693):  Resolved on 5/18/1981 at 20 years.  Pregnancy (219877093):  Resolved on 10/28/1983 at 22 years.  Pregnancy (737677419):  Resolved on 12/23/1984 at 23 years.   Family History:    CA - Breast cancer  Aunt (P)  Comments:  5/29/2012 7:09 PM - Brittny Hutchison  Late 40s or early 50s.  Alzheimer's disease  Father  CAD - Coronary artery disease  Father     Procedure history:    Colonoscopy (743625236) on 7/7/2011 at 50 Years.  Comments:  12/16/2011 9:03 AM - Rosanna Rodriguez  Repeat in 5-10 yrs. pending pathology.   IV sedation.  Flexible sigmoidoscope (480448390) on 5/2/2007 at 46 Years.  Hysteroscopy (671132846) on 3/21/2002 at 41 Years.  D&C - Dilatation and curettage (661960) on 3/21/2002 at 41 Years.  Breast augmentation (6536155109) in 1996 at 36 Years.  Tubal ligation (733666951) in 1984 at 24 Years.  Pregnancy (042812231).  Comments:  4/15/2010 3:54 PM - Justine Roland  full term X 3   Social History:        Alcohol Assessment            Current, 2 times per week, 6 drinks/episode average.      Tobacco Assessment            Current, Cigarettes, 20 per day.  34 year(s).      Substance Abuse Assessment             Past, Marijuana      Employment and Education Assessment            Employed, Work/School description: /.      Home and Environment Assessment            Marital status: .  Spouse/Partner name: Mihai.  Risks in environment: Does not wear helmet, owns               secured gun.      Nutrition and Health Assessment            Type of diet: Regular.      Exercise and Physical Activity Assessment            Exercise frequency: Daily.  Exercise type: Walking.      Sexual Assessment            Sexually active: No.  Sexual orientation: Heterosexual.        Physical Examination   Vital Signs   8/27/2018 3:46 PM CDT Peripheral Pulse Rate 68 bpm    Pulse Site Radial artery    Systolic Blood Pressure 120 mmHg    Diastolic Blood Pressure 66 mmHg    Mean Arterial Pressure 84 mmHg    BP Site Right arm    Oxygen Saturation 98 %      Measurements from flowsheet : Measurements   8/27/2018 3:46 PM CDT Height Measured - Standard 66.5 in    Weight Measured - Standard 135.6 lb    BSA 1.7 m2    Body Mass Index 21.56 kg/m2      General:  Alert and oriented, No acute distress.    Eye:  Pupils are equal, round and reactive to light, Normal conjunctiva.    HENT:  Normocephalic, Tympanic membranes are clear, Oral mucosa is moist, No pharyngeal erythema.    Neck:  Supple, Non-tender, No lymphadenopathy.    Respiratory:  Lungs are clear to auscultation, Respirations are non-labored.    Cardiovascular:  Normal rate, Regular rhythm, Normal peripheral perfusion, No edema.    Breast:  No mass, No tenderness, No discharge.         Shape/size: Bilaterally, Within normal limits.    Gastrointestinal:  Soft, Non-tender, Non-distended, No organomegaly.    Musculoskeletal:  Normal range of motion, Normal strength, No swelling, Normal gait.    Integumentary:  Warm, No rash.    Neurologic:  Alert, Oriented.    Psychiatric:  Cooperative, Appropriate mood & affect, Normal judgment.       Review / Management   Results  review      Impression and Plan       Diagnosis     Well adult exam (FMO71-PI Z00.00).     Mild depression (NYK30-FA F32.0).     Emphysema/COPD (WAW49-GR J43.9).     Course:  reviewed exercise and diet   discussed weight and nutrition  reviewed health maintence and encouraged completion  questions were answered regarding health, medicines and future expectations.    Plan:  Return for fasting lab work     Will refill the Ibuprofen and the Citalopram     Mammogram form given    Reccomended a Shingrix vaccine at the pharmacy     Encouraged good stretching daily due to physical job.     Return in one year for next annual exam. .    Melinda TELLES Medical Assistant acted solely as a scribe for, and in presence of Dr. Serge Millan who performed the services.

## 2022-02-16 NOTE — TELEPHONE ENCOUNTER
---------------------  From: Serge Millan MD   To: MYA MCDOWELL    Sent: 6/28/2021 10:01:01 AM CDT  Subject: General Message         Your pap smear is normal

## 2022-02-16 NOTE — NURSING NOTE
Comprehensive Intake Entered On:  7/27/2020 8:16 AM CDT    Performed On:  7/27/2020 8:11 AM CDT by Olvin MENDIOLA, Geeta               Summary   Chief Complaint :   verbal consent given for telemed visit.   liss w/ COVID, employer wants pt to get tested, has been asymptomatic, works for school district, has been working.   Menstrual Status :   Postmenopausal   Geeta Bah MA - 7/27/2020 8:11 AM CDT   Health Status   Allergies Verified? :   Yes   Medication History Verified? :   Yes   Immunizations Current :   No   Medical History Verified? :   Yes   Pre-Visit Planning Status :   Completed   Tobacco Use? :   Current every day smoker   Olvin MENDIOLA, Geeta - 7/27/2020 8:11 AM CDT   Consents   Consent for Immunization Exchange :   Consent Granted   Consent for Immunizations to Providers :   Consent Granted   Geeta Bah MA - 7/27/2020 8:11 AM CDT   Meds / Allergies   (As Of: 7/27/2020 8:16:52 AM CDT)   Allergies (Active)   No known allergies  Estimated Onset Date:   Unspecified ; Created By:   Justine Roland; Reaction Status:   Active ; Category:   Drug ; Substance:   No known allergies ; Type:   Allergy ; Updated By:   Justine Roland; Source:   Paper Chart ; Reviewed Date:   10/7/2019 4:09 PM CDT        Medication List   (As Of: 7/27/2020 8:16:52 AM CDT)   Prescription/Discharge Order    citalopram  :   citalopram ; Status:   Prescribed ; Ordered As Mnemonic:   citalopram 40 mg oral tablet ; Simple Display Line:   1 tab(s), Oral, daily, 90 tab(s), 0 Refill(s) ; Ordering Provider:   Serge Millan MD; Catalog Code:   citalopram ; Order Dt/Tm:   3/16/2020 11:02:35 PM CDT          ibuprofen  :   ibuprofen ; Status:   Prescribed ; Ordered As Mnemonic:   ibuprofen 800 mg oral tablet ; Simple Display Line:   1 tab(s), Oral, q8 hrs, 50 tab(s), 0 Refill(s) ; Ordering Provider:   Serge Millan MD; Catalog Code:   ibuprofen ; Order Dt/Tm:   10/1/2019 10:54:16 AM CDT            Home Meds     acetaminophen  :   acetaminophen ; Status:   Deleted ; Ordered As Mnemonic:   Tylenol Extra Strength ; Simple Display Line:   Oral, q6 hrs, 0 Refill(s) ; Catalog Code:   acetaminophen ; Order Dt/Tm:   10/7/2019 4:09:39 PM CDT            ID Risk Screen   Recent Travel History :   No recent travel   Family Member Travel History :   No recent travel   Other Exposure to Infectious Disease :   Community exposure to COVID-19 within the last 14 days   Geeta Bah MA - 7/27/2020 8:11 AM CDT   Social History   Social History   (As Of: 7/27/2020 8:16:52 AM CDT)   Alcohol:        Current, 1-2 times per week, 3 drinks/episode average.  4 drinks/episode maximum.   (Last Updated: 8/29/2018 8:49:40 AM CDT by Patricia Ohara)          Tobacco:        Current, Cigarettes, 20 per day.  34 year(s).   (Last Updated: 1/30/2015 3:58:41 PM CST by Sukhjinder Holliday CMA)          Substance Abuse:        Past, Marijuana   (Last Updated: 5/16/2017 10:32:03 AM CDT by Patricia Ohara)          Employment/School:        Employed, Work/School description: /.   (Last Updated: 5/16/2017 10:31:33 AM CDT by Patricia Ohara)          Home/Environment:        Marital status: .  Spouse/Partner name: Mihai.  Risks in environment: Does not wear helmet, owns secured gun.   (Last Updated: 5/16/2017 10:32:28 AM CDT by Patricia Ohara)          Nutrition/Health:        Type of diet: Regular.   (Last Updated: 3/15/2016 12:59:49 PM CDT by Melinda Drake MA)          Exercise:        Exercise frequency: Daily.  Exercise type: Walking.   (Last Updated: 5/16/2017 10:32:54 AM CDT by Patricia Ohara)          Sexual:        Sexually active: No.  Identifies as female, Sexual orientation: Straight or heterosexual.   (Last Updated: 8/29/2018 8:50:40 AM CDT by Patricia Ohara)

## 2022-02-16 NOTE — LETTER
(Inserted Image. Unable to display)   August 28, 2019        MYA MCDOWELL  [address], [place] [id number]        Dear MYA,      Thank you for selecting [facility] (previously [facility]) for your healthcare needs.    Our records indicate you are due for the following services:     Annual Physical    To schedule an appointment or if you have further questions, please contact your primary clinic:   [facility]       [phone]   [facility]       [phone]              [facility]     [phone]      Powered by [employer] and [employer] [place]    Sincerely,    Serge Millan MD

## 2022-02-16 NOTE — TELEPHONE ENCOUNTER
Entered by Zeynep Milian CMA on March 16, 2020 11:02:48 PM CDT  ---------------------  From: Zeyenp Milian CMA   To: [place] [address]    Sent: 3/16/2020 11:02:48 PM CDT  Subject: Medication Management     ** Submitted: **  Order:citalopram (citalopram 40 mg oral tablet)  1 tab(s)  Oral  daily  Qty:  90 tab(s)        Refills:  0          Substitutions Allowed     Route To Pharmacy - [place] [address]    Signed by Zeynep Milian CMA  3/16/2020 11:02:00 PM    ** Submitted: **  Complete:citalopram (citalopram 40 mg oral tablet)   Signed by Zeynep Milian CMA  3/16/2020 11:02:00 PM    ** Not Approved:  **  citalopram (Citalopram Hydrobromide 40 MG Oral Tablet)  Take 1 tablet by mouth daily  Qty:  30 EA        Days Supply:  30        Refills:  0          Substitutions Allowed     Route To Pharmacy - [place] [address]   Signed by Zeynep Milian CMA            ------------------------------------------  From: [place] [address]  To: Serge Millan MD  Sent: March 16, 2020 2:38:11 PM CDT  Subject: Medication Management  Due: March 17, 2020 2:38:11 PM CDT    ** On Hold Pending Signature **  Drug: citalopram (citalopram 40 mg oral tablet)  TAKE 1 TABLET BY MOUTH DAILY  Quantity: 30 EA  Days Supply: 30  Refills: 0  Substitutions Allowed  Notes from Pharmacy:     Dispensed Drug: citalopram (citalopram 40 mg oral tablet)  Take 1 tablet by mouth daily  Quantity: 30 EA  Days Supply: 30  Refills: 0  Substitutions Allowed  Notes from Pharmacy:   ------------------------------------------Med Refill      Date of last office visit and reason:  10/7/19; concussion      Date of last Med Check / Px:   8/27/18; well adult  Date of last labs pertaining to med:  10/4/19    RTC order in chart:  yes; due now for px    For Protocol refill, has patient been contacted:  msg sent to pharmacy

## 2022-02-16 NOTE — PROCEDURES
Accession Number:       [id number]  CLINICAL INFORMATION::     None given  LMP::     NONE GIVEN  PREV. PAP::     NONE GIVEN  PREV. BX::     NONE GIVEN  SOURCE::     None given  STATEMENT OF ADEQUACY::     Satisfactory for evaluation. Endocervical/transformation zone component present.  INTERPRETATION/RESULT::     Negative for intraepithelial lesion or malignancy.  COMMENT::     This Pap test has been evaluated with computer assisted technology.  CYTOTECHNOLOGIST::     TOSHIA REYES(ASCP) CT Screening location: [place] [id number]  COMMENT:     See comment       EXPLANATORY NOTE:         The Pap is a screening test for cervical cancer. It is       not a diagnostic test and is subject to false negative       and false positive results. It is most reliable when a       satisfactory sample, regularly obtained, is submitted       with relevant clinical findings and history, and when       the Pap result is evaluated along with historic and       current clinical information.  HPV mRNA E6/E7:     Not Detected       Methodology: Transcription-Mediated Amplification       This assay detects E6/E7 viral messenger RNA (mRNA) from 14       high-risk HPV types (16,18,31,33,35,39,45,51,52,56,58,59,66,68).           The analytical performance characteristics of this       assay have been determined by [employer].       The modifications have not been cleared or approved       by the FDA. This assay has been validated pursuant       to the CLIA regulations and is used for       clinical purposes.         For additional information, please refer to       http://[email or web address].[email or web address]/faq/MGA721q4       (This link if provided for information/       educational purposes only.)

## 2022-02-16 NOTE — RESULTS
Patient:   MYA MCDOWELL            MRN: [id number]            FIN: [id number]               Age:   58 years     Sex:  Female     :  1960   Associated Diagnoses:   None   Author:   Julian LAZCANO, López      Procedure   EKG procedure   Indication: Syncope.     EKG findings   Interpretation: by primary care provider.     Sinus rhythm. Normal QTc and ID interval with no qwaves or ST changes. Good R wave progression .        Impression and Plan   Diagnosis     normal EKG.

## 2022-02-16 NOTE — PROGRESS NOTES
"Chief Complaint    Pt here for states she fell and hit her arm on a towel rack DOI 9-19.  Pt states she \"blacked out\" and doesnt remember falling.  History of Present Illness      59-year-old woman is here to have a laceration or tear in her right inner upper arm.       She fell and hit the towel rack and caused a cut.  She has been keeping it clean but it has not healed so she wanted it looked at.  States she did have some alcohol to drink and so does not really remember the fall well.  This did occur 4 days previously.  She denies any numbness or tingling.  He is able to use her arm  Review of Systems      Insert for review of symptoms  Physical Exam   Vitals & Measurements    T: 98.8  F (Tympanic)  HR: 64 (Peripheral)  RR: 16  BP: 120/68     HT: 66.5 in  WT: 151 lb  BMI: 24       Right upper inner arm and rectangular laceration is relatively deep into the soft tissue.  No muscle was exposed.  She has good flexion extension and movement of the upper arm.  There is no debris or signs of infection  Assessment/Plan       1. Laceration of arm (S41.119A)         Surgical day old laceration it will need to heal secondarily.  It is relatively large gap at the tissue is not under any tension in this area.  It appears clean but I discussed with her the risk of infection.  That this may take 1 or 2 months to completely heal  Patient Information     Name:MYA MCDOWELL      Address:      [address] [place] [phone]     Sex:Female     YOB: 1960     [phone]     Emergency Contact:SERGO MCDOWELL     MRN:[id number]     FIN:[id number]     Location:[facility]     Date of Service:09/22/2020      Primary Care Physician:       Serge Millan MD, [phone]      Attending Physician:       Serge Millan MD, [phone]  Problem List/Past Medical History    Ongoing     Chronic diarrhea     Emphysema/COPD     Mild depression     Tobacco Abuse-Unspec    Historical     " Pregnancy     Pregnancy     Pregnancy  Procedure/Surgical History     Colonoscopy (07/07/2011)      Comments: Repeat in 5-10 yrs. pending pathology.      IV sedation..     Mammography; bilateral (01/28/2009)     Flexible sigmoidoscope (05/02/2007)     D&C - Dilatation and curettage (03/21/2002)     Hysteroscopy (03/21/2002)     Breast augmentation (1996)     Tubal ligation (1984)     Pregnancy      Comments: full term X 3.  Medications    Chantix 1 mg oral tablet, 1 mg= 1 tab(s), Oral, bid, 5 refills    citalopram 40 mg oral tablet, 1 tab(s), Oral, daily    ibuprofen 800 mg oral tablet, 1 tab(s), Oral, q8 hrs  Allergies    No known allergies  Social History    Smoking Status     Current every day smoker     Alcohol      Current, 1-2 times per week, 3 drinks/episode average. 4 drinks/episode maximum.     Employment/School      Employed, Work/School description: /.     Exercise      Exercise frequency: Daily. Exercise type: Walking.     Home/Environment      Marital status: . Spouse/Partner name: Mihai. Risks in environment: Does not wear helmet, owns secured gun.     Nutrition/Health      Type of diet: Regular.     Sexual      Sexually active: No. Identifies as female, Sexual orientation: Straight or heterosexual.     Substance Abuse      Past, Marijuana     Tobacco      Current, Cigarettes, 20 per day. 34 year(s).  Family History    Alzheimer's disease: Father.    Arthritis: Father.    CA - Breast cancer: Aunt (P).    CAD - Coronary artery disease: Father.    High cholesterol: Father.    Sister: History is negative    Brother: History is negative    Brother: History is negative    Daughter: History is negative    Daughter: History is negative    Daughter: History is negative  Immunizations      Vaccine Date Status          pneumococcal (PCV13) 05/16/2017 Given          pneumococcal (PPSV23) 03/15/2016 Given          tetanus/diphth/pertuss (Tdap) adult/adol 12/20/2012 Given           Td 07/26/2005 Recorded  Lab Results          Lab Results (Last 4 results within 90 days)           Coronavirus SARS-CoV-2 (COVID-19): NOT DETECTED (07/27/20 10:13:00)

## 2022-02-16 NOTE — PROGRESS NOTES
Patient:   MYA MCDOWELL            MRN: [id number]            FIN: [id number]               Age:   58 years     Sex:  Female     :  1960   Associated Diagnoses:   None   Author:   Serge Millan MD       -   Today's date:  10/7/2019 4:27:00 PM .        -   To whom it may concern:        This patient is currently under my care and Was seen in my office on  10/7/2019.  .     Please excuse him/ her from work, for the next  2  days.  He/ she may return to work, on  10/10/2019.  Please contact me if you have any questions or concerns.      -   Sincerely,

## 2022-02-16 NOTE — PROGRESS NOTES
Patient:   MYA MCDOWELL            MRN: [id number]            FIN: [id number]               Age:   57 years     Sex:  Female     :  1960   Associated Diagnoses:   None   Author:   Melinda Drake MA       -   To whom it may concern:        This patient is currently under my care and Was seen in my office on  2018 3:39:00 PM.  .  He/ she may return to work, without restrictions.  Please contact me if you have any questions or concerns.      -   Sincerely,

## 2022-02-16 NOTE — RESULTS
Spirometry POC Entered On:  5/16/2017 10:26 AM CDT    Performed On:  5/16/2017 9:57 AM CDT by Justine Redmond CMA               Spirometry POC   Forced Vital Capacity Predicted :   3.66 L   Forced Vital Capacity Actual :   2.43 L   Forced Vital Capacity % Predicted :   66 %   Forced Expiratory Volume 1sec Predicted :   2.85 L   Forced Expiratory Volume 1sec Actual :   1.91 L   Forced Expiratory Volume 1 % Predicted :   67 %   FEV1/FVC Predicted :   79 %   FEV1/FVC Actual :   79 %   FEV1/FVC % Predicted :   100 %   FEF 25 to 75   Predicted :   2.64 L   FEF 25 to 75 Actual :   1.66 L   FEF 25 to 75 % Predicted :   63 %   Justine Redmond CMA - 5/16/2017 10:25 AM CDT